# Patient Record
Sex: FEMALE | Race: WHITE | NOT HISPANIC OR LATINO
[De-identification: names, ages, dates, MRNs, and addresses within clinical notes are randomized per-mention and may not be internally consistent; named-entity substitution may affect disease eponyms.]

---

## 2021-05-21 DIAGNOSIS — Z01.818 ENCOUNTER FOR OTHER PREPROCEDURAL EXAMINATION: ICD-10-CM

## 2021-05-22 ENCOUNTER — APPOINTMENT (OUTPATIENT)
Dept: DISASTER EMERGENCY | Facility: CLINIC | Age: 46
End: 2021-05-22

## 2023-01-10 ENCOUNTER — NON-APPOINTMENT (OUTPATIENT)
Age: 48
End: 2023-01-10

## 2023-12-22 ENCOUNTER — NON-APPOINTMENT (OUTPATIENT)
Age: 48
End: 2023-12-22

## 2023-12-26 ENCOUNTER — NON-APPOINTMENT (OUTPATIENT)
Age: 48
End: 2023-12-26

## 2024-02-10 ENCOUNTER — NON-APPOINTMENT (OUTPATIENT)
Age: 49
End: 2024-02-10

## 2024-02-19 ENCOUNTER — NON-APPOINTMENT (OUTPATIENT)
Age: 49
End: 2024-02-19

## 2024-08-01 ENCOUNTER — NON-APPOINTMENT (OUTPATIENT)
Age: 49
End: 2024-08-01

## 2024-10-01 ENCOUNTER — NON-APPOINTMENT (OUTPATIENT)
Age: 49
End: 2024-10-01

## 2024-10-23 ENCOUNTER — NON-APPOINTMENT (OUTPATIENT)
Age: 49
End: 2024-10-23

## 2024-11-26 ENCOUNTER — APPOINTMENT (OUTPATIENT)
Dept: UROLOGY | Facility: CLINIC | Age: 49
End: 2024-11-26

## 2024-12-26 ENCOUNTER — NON-APPOINTMENT (OUTPATIENT)
Age: 49
End: 2024-12-26

## 2025-06-10 ENCOUNTER — INPATIENT (INPATIENT)
Facility: HOSPITAL | Age: 50
LOS: 2 days | Discharge: HOME | DRG: 690 | End: 2025-06-13
Attending: HOSPITALIST | Admitting: HOSPITALIST
Payer: COMMERCIAL

## 2025-06-10 VITALS
HEART RATE: 110 BPM | DIASTOLIC BLOOD PRESSURE: 69 MMHG | SYSTOLIC BLOOD PRESSURE: 110 MMHG | RESPIRATION RATE: 22 BRPM | WEIGHT: 136.91 LBS | OXYGEN SATURATION: 98 % | TEMPERATURE: 99 F

## 2025-06-10 DIAGNOSIS — Z98.890 OTHER SPECIFIED POSTPROCEDURAL STATES: Chronic | ICD-10-CM

## 2025-06-10 DIAGNOSIS — N12 TUBULO-INTERSTITIAL NEPHRITIS, NOT SPECIFIED AS ACUTE OR CHRONIC: ICD-10-CM

## 2025-06-10 LAB
ALBUMIN SERPL ELPH-MCNC: 4.1 G/DL — SIGNIFICANT CHANGE UP (ref 3.3–5.2)
ALP SERPL-CCNC: 82 U/L — SIGNIFICANT CHANGE UP (ref 40–120)
ALT FLD-CCNC: 23 U/L — SIGNIFICANT CHANGE UP
ANION GAP SERPL CALC-SCNC: 13 MMOL/L — SIGNIFICANT CHANGE UP (ref 5–17)
APPEARANCE UR: CLEAR — SIGNIFICANT CHANGE UP
AST SERPL-CCNC: 27 U/L — SIGNIFICANT CHANGE UP
BACTERIA # UR AUTO: NEGATIVE /HPF — SIGNIFICANT CHANGE UP
BASOPHILS # BLD AUTO: 0.02 K/UL — SIGNIFICANT CHANGE UP (ref 0–0.2)
BASOPHILS # BLD MANUAL: 0 K/UL — SIGNIFICANT CHANGE UP (ref 0–0.2)
BASOPHILS NFR BLD AUTO: 0.2 % — SIGNIFICANT CHANGE UP (ref 0–2)
BASOPHILS NFR BLD MANUAL: 0 % — SIGNIFICANT CHANGE UP (ref 0–2)
BILIRUB SERPL-MCNC: 0.8 MG/DL — SIGNIFICANT CHANGE UP (ref 0.4–2)
BILIRUB UR-MCNC: NEGATIVE — SIGNIFICANT CHANGE UP
BUN SERPL-MCNC: 14.9 MG/DL — SIGNIFICANT CHANGE UP (ref 8–20)
CALCIUM SERPL-MCNC: 9.1 MG/DL — SIGNIFICANT CHANGE UP (ref 8.4–10.5)
CAST: 0 /LPF — SIGNIFICANT CHANGE UP (ref 0–4)
CHLORIDE SERPL-SCNC: 99 MMOL/L — SIGNIFICANT CHANGE UP (ref 96–108)
CO2 SERPL-SCNC: 25 MMOL/L — SIGNIFICANT CHANGE UP (ref 22–29)
COLOR SPEC: YELLOW — SIGNIFICANT CHANGE UP
CREAT SERPL-MCNC: 0.56 MG/DL — SIGNIFICANT CHANGE UP (ref 0.5–1.3)
DIFF PNL FLD: ABNORMAL
EGFR: 113 ML/MIN/1.73M2 — SIGNIFICANT CHANGE UP
EGFR: 113 ML/MIN/1.73M2 — SIGNIFICANT CHANGE UP
EOSINOPHIL # BLD AUTO: 0.01 K/UL — SIGNIFICANT CHANGE UP (ref 0–0.5)
EOSINOPHIL # BLD MANUAL: 0 K/UL — SIGNIFICANT CHANGE UP (ref 0–0.5)
EOSINOPHIL NFR BLD AUTO: 0.1 % — SIGNIFICANT CHANGE UP (ref 0–6)
EOSINOPHIL NFR BLD MANUAL: 0 % — SIGNIFICANT CHANGE UP (ref 0–6)
GLUCOSE SERPL-MCNC: 96 MG/DL — SIGNIFICANT CHANGE UP (ref 70–99)
GLUCOSE UR QL: NEGATIVE MG/DL — SIGNIFICANT CHANGE UP
HCG SERPL-ACNC: <4 MIU/ML — SIGNIFICANT CHANGE UP
HCT VFR BLD CALC: 41.3 % — SIGNIFICANT CHANGE UP (ref 34.5–45)
HGB BLD-MCNC: 13.9 G/DL — SIGNIFICANT CHANGE UP (ref 11.5–15.5)
IMM GRANULOCYTES # BLD AUTO: 0.04 K/UL — SIGNIFICANT CHANGE UP (ref 0–0.07)
IMM GRANULOCYTES NFR BLD AUTO: 0.3 % — SIGNIFICANT CHANGE UP (ref 0–0.9)
KETONES UR QL: ABNORMAL MG/DL
LACTATE BLDV-MCNC: 1 MMOL/L — SIGNIFICANT CHANGE UP (ref 0.5–2)
LEUKOCYTE ESTERASE UR-ACNC: ABNORMAL
LIDOCAIN IGE QN: 22 U/L — SIGNIFICANT CHANGE UP (ref 22–51)
LYMPHOCYTES # BLD AUTO: 0.3 K/UL — LOW (ref 1–3.3)
LYMPHOCYTES # BLD MANUAL: 0.61 K/UL — LOW (ref 1–3.3)
LYMPHOCYTES NFR BLD AUTO: 2.4 % — LOW (ref 13–44)
LYMPHOCYTES NFR BLD MANUAL: 5 % — LOW (ref 13–44)
MANUAL NEUTROPHIL BANDS #: 1.24 K/UL — HIGH (ref 0–0.84)
MANUAL SMEAR VERIFICATION: SIGNIFICANT CHANGE UP
MCHC RBC-ENTMCNC: 29.1 PG — SIGNIFICANT CHANGE UP (ref 27–34)
MCHC RBC-ENTMCNC: 33.7 G/DL — SIGNIFICANT CHANGE UP (ref 32–36)
MCV RBC AUTO: 86.4 FL — SIGNIFICANT CHANGE UP (ref 80–100)
MONOCYTES # BLD AUTO: 0.14 K/UL — SIGNIFICANT CHANGE UP (ref 0–0.9)
MONOCYTES # BLD MANUAL: 0.1 K/UL — SIGNIFICANT CHANGE UP (ref 0–0.9)
MONOCYTES NFR BLD AUTO: 1.1 % — LOW (ref 2–14)
MONOCYTES NFR BLD MANUAL: 0.8 % — LOW (ref 2–14)
NEUTROPHILS # BLD AUTO: 11.78 K/UL — HIGH (ref 1.8–7.4)
NEUTROPHILS # BLD MANUAL: 10.34 K/UL — HIGH (ref 1.8–7.4)
NEUTROPHILS NFR BLD AUTO: 95.9 % — HIGH (ref 43–77)
NEUTROPHILS NFR BLD MANUAL: 84.1 % — HIGH (ref 43–77)
NEUTS BAND # BLD: 10.1 % — HIGH (ref 0–8)
NEUTS BAND NFR BLD: 10.1 % — HIGH (ref 0–8)
NITRITE UR-MCNC: NEGATIVE — SIGNIFICANT CHANGE UP
NRBC # BLD AUTO: 0 K/UL — SIGNIFICANT CHANGE UP (ref 0–0)
NRBC # FLD: 0 K/UL — SIGNIFICANT CHANGE UP (ref 0–0)
NRBC BLD AUTO-RTO: 0 /100 WBCS — SIGNIFICANT CHANGE UP (ref 0–0)
PH UR: 6.5 — SIGNIFICANT CHANGE UP (ref 5–8)
PLAT MORPH BLD: NORMAL — SIGNIFICANT CHANGE UP
PLATELET # BLD AUTO: 249 K/UL — SIGNIFICANT CHANGE UP (ref 150–400)
PMV BLD: 11.7 FL — SIGNIFICANT CHANGE UP (ref 7–13)
POTASSIUM SERPL-MCNC: 3.7 MMOL/L — SIGNIFICANT CHANGE UP (ref 3.5–5.3)
POTASSIUM SERPL-SCNC: 3.7 MMOL/L — SIGNIFICANT CHANGE UP (ref 3.5–5.3)
PROT SERPL-MCNC: 7 G/DL — SIGNIFICANT CHANGE UP (ref 6.6–8.7)
PROT UR-MCNC: SIGNIFICANT CHANGE UP MG/DL
RBC # BLD: 4.78 M/UL — SIGNIFICANT CHANGE UP (ref 3.8–5.2)
RBC # FLD: 12.3 % — SIGNIFICANT CHANGE UP (ref 10.3–14.5)
RBC BLD AUTO: NORMAL — SIGNIFICANT CHANGE UP
RBC CASTS # UR COMP ASSIST: 3 /HPF — SIGNIFICANT CHANGE UP (ref 0–4)
SODIUM SERPL-SCNC: 137 MMOL/L — SIGNIFICANT CHANGE UP (ref 135–145)
SP GR SPEC: 1.02 — SIGNIFICANT CHANGE UP (ref 1–1.03)
SQUAMOUS # UR AUTO: 6 /HPF — HIGH (ref 0–5)
UROBILINOGEN FLD QL: 0.2 MG/DL — SIGNIFICANT CHANGE UP (ref 0.2–1)
WBC # BLD: 12.29 K/UL — HIGH (ref 3.8–10.5)
WBC # FLD AUTO: 12.29 K/UL — HIGH (ref 3.8–10.5)
WBC UR QL: 86 /HPF — HIGH (ref 0–5)

## 2025-06-10 PROCEDURE — 99223 1ST HOSP IP/OBS HIGH 75: CPT

## 2025-06-10 PROCEDURE — 93010 ELECTROCARDIOGRAM REPORT: CPT

## 2025-06-10 PROCEDURE — 99285 EMERGENCY DEPT VISIT HI MDM: CPT

## 2025-06-10 RX ORDER — CEFTRIAXONE 500 MG/1
2000 INJECTION, POWDER, FOR SOLUTION INTRAMUSCULAR; INTRAVENOUS EVERY 24 HOURS
Refills: 0 | Status: DISCONTINUED | OUTPATIENT
Start: 2025-06-11 | End: 2025-06-13

## 2025-06-10 RX ORDER — TRAMADOL HYDROCHLORIDE 50 MG/1
25 TABLET, FILM COATED ORAL EVERY 6 HOURS
Refills: 0 | Status: DISCONTINUED | OUTPATIENT
Start: 2025-06-10 | End: 2025-06-13

## 2025-06-10 RX ORDER — KETOROLAC TROMETHAMINE 30 MG/ML
15 INJECTION, SOLUTION INTRAMUSCULAR; INTRAVENOUS ONCE
Refills: 0 | Status: DISCONTINUED | OUTPATIENT
Start: 2025-06-10 | End: 2025-06-10

## 2025-06-10 RX ORDER — CEFTRIAXONE 500 MG/1
1000 INJECTION, POWDER, FOR SOLUTION INTRAMUSCULAR; INTRAVENOUS ONCE
Refills: 0 | Status: DISCONTINUED | OUTPATIENT
Start: 2025-06-10 | End: 2025-06-10

## 2025-06-10 RX ORDER — CEFTRIAXONE 500 MG/1
2000 INJECTION, POWDER, FOR SOLUTION INTRAMUSCULAR; INTRAVENOUS ONCE
Refills: 0 | Status: DISCONTINUED | OUTPATIENT
Start: 2025-06-10 | End: 2025-06-10

## 2025-06-10 RX ORDER — ONDANSETRON HCL/PF 4 MG/2 ML
4 VIAL (ML) INJECTION EVERY 8 HOURS
Refills: 0 | Status: DISCONTINUED | OUTPATIENT
Start: 2025-06-10 | End: 2025-06-13

## 2025-06-10 RX ORDER — MELATONIN 5 MG
3 TABLET ORAL AT BEDTIME
Refills: 0 | Status: DISCONTINUED | OUTPATIENT
Start: 2025-06-10 | End: 2025-06-13

## 2025-06-10 RX ORDER — TRAMADOL HYDROCHLORIDE 50 MG/1
50 TABLET, FILM COATED ORAL EVERY 6 HOURS
Refills: 0 | Status: DISCONTINUED | OUTPATIENT
Start: 2025-06-10 | End: 2025-06-13

## 2025-06-10 RX ORDER — ACETAMINOPHEN 500 MG/5ML
650 LIQUID (ML) ORAL EVERY 6 HOURS
Refills: 0 | Status: DISCONTINUED | OUTPATIENT
Start: 2025-06-10 | End: 2025-06-13

## 2025-06-10 RX ORDER — CEFTRIAXONE 500 MG/1
2000 INJECTION, POWDER, FOR SOLUTION INTRAMUSCULAR; INTRAVENOUS EVERY 24 HOURS
Refills: 0 | Status: DISCONTINUED | OUTPATIENT
Start: 2025-06-10 | End: 2025-06-10

## 2025-06-10 RX ORDER — SODIUM CHLORIDE 9 G/1000ML
2000 INJECTION, SOLUTION INTRAVENOUS ONCE
Refills: 0 | Status: COMPLETED | OUTPATIENT
Start: 2025-06-10 | End: 2025-06-10

## 2025-06-10 RX ORDER — SUCRALFATE 1 G
1 TABLET ORAL
Refills: 0 | Status: DISCONTINUED | OUTPATIENT
Start: 2025-06-10 | End: 2025-06-13

## 2025-06-10 RX ORDER — SUMATRIPTAN 100 MG/1
25 TABLET, FILM COATED ORAL DAILY
Refills: 0 | Status: DISCONTINUED | OUTPATIENT
Start: 2025-06-10 | End: 2025-06-13

## 2025-06-10 RX ORDER — SODIUM CHLORIDE 9 G/1000ML
1000 INJECTION, SOLUTION INTRAVENOUS
Refills: 0 | Status: DISCONTINUED | OUTPATIENT
Start: 2025-06-10 | End: 2025-06-13

## 2025-06-10 RX ORDER — MAGNESIUM, ALUMINUM HYDROXIDE 200-200 MG
30 TABLET,CHEWABLE ORAL EVERY 4 HOURS
Refills: 0 | Status: DISCONTINUED | OUTPATIENT
Start: 2025-06-10 | End: 2025-06-13

## 2025-06-10 RX ORDER — CEFTRIAXONE 500 MG/1
1000 INJECTION, POWDER, FOR SOLUTION INTRAMUSCULAR; INTRAVENOUS ONCE
Refills: 0 | Status: COMPLETED | OUTPATIENT
Start: 2025-06-10 | End: 2025-06-10

## 2025-06-10 RX ORDER — OMEPRAZOLE 20 MG/1
1 CAPSULE, DELAYED RELEASE ORAL
Refills: 0 | DISCHARGE

## 2025-06-10 RX ORDER — PHENAZOPYRIDINE HCL 100 MG
100 TABLET ORAL EVERY 8 HOURS
Refills: 0 | Status: COMPLETED | OUTPATIENT
Start: 2025-06-10 | End: 2025-06-12

## 2025-06-10 RX ORDER — ONDANSETRON HCL/PF 4 MG/2 ML
4 VIAL (ML) INJECTION ONCE
Refills: 0 | Status: COMPLETED | OUTPATIENT
Start: 2025-06-10 | End: 2025-06-10

## 2025-06-10 RX ADMIN — KETOROLAC TROMETHAMINE 15 MILLIGRAM(S): 30 INJECTION, SOLUTION INTRAMUSCULAR; INTRAVENOUS at 08:19

## 2025-06-10 RX ADMIN — TRAMADOL HYDROCHLORIDE 50 MILLIGRAM(S): 50 TABLET, FILM COATED ORAL at 17:44

## 2025-06-10 RX ADMIN — SUMATRIPTAN 25 MILLIGRAM(S): 100 TABLET, FILM COATED ORAL at 15:36

## 2025-06-10 RX ADMIN — Medication 1 GRAM(S): at 17:44

## 2025-06-10 RX ADMIN — Medication 4 MILLIGRAM(S): at 08:19

## 2025-06-10 RX ADMIN — CEFTRIAXONE 1000 MILLIGRAM(S): 500 INJECTION, POWDER, FOR SOLUTION INTRAMUSCULAR; INTRAVENOUS at 08:19

## 2025-06-10 RX ADMIN — SODIUM CHLORIDE 2000 MILLILITER(S): 9 INJECTION, SOLUTION INTRAVENOUS at 08:19

## 2025-06-10 RX ADMIN — SUMATRIPTAN 25 MILLIGRAM(S): 100 TABLET, FILM COATED ORAL at 16:30

## 2025-06-10 RX ADMIN — Medication 100 MILLIGRAM(S): at 21:38

## 2025-06-10 RX ADMIN — Medication 100 MILLIGRAM(S): at 13:22

## 2025-06-10 RX ADMIN — SODIUM CHLORIDE 100 MILLILITER(S): 9 INJECTION, SOLUTION INTRAVENOUS at 21:39

## 2025-06-10 RX ADMIN — TRAMADOL HYDROCHLORIDE 50 MILLIGRAM(S): 50 TABLET, FILM COATED ORAL at 18:58

## 2025-06-10 RX ADMIN — CEFTRIAXONE 1000 MILLIGRAM(S): 500 INJECTION, POWDER, FOR SOLUTION INTRAMUSCULAR; INTRAVENOUS at 11:45

## 2025-06-10 NOTE — ED ADULT NURSE NOTE - OBJECTIVE STATEMENT
Assumed care of pt at 0714 in . Pt A&Ox4 c/o abdominal pain. pt states she has frequent kidney infections and feels like one is coming on.

## 2025-06-10 NOTE — H&P ADULT - ASSESSMENT
49y/oF PMH recurrent UTIs with hx sepsis/bacteremia (starting in 8/2024), Asherman syndrome, GERD presenting to ER c/o subjective fever, chills, low back pain L>R, dysuria. Reports similar symptoms with UTI/pyelonephritis in the past, admitted with pyelonephritis     Pyelonephritis   Leukocytosis w/bandemia   -s/p 1g ceftriaxone, 2L LR in ER  49y/oF PMH recurrent UTIs with hx sepsis/bacteremia (starting in 8/2024), Asherman syndrome, GERD presenting to ER c/o subjective fever, chills, low back pain L>R, dysuria. Reports similar symptoms with UTI/pyelonephritis in the past, admitted with pyelonephritis     Pyelonephritis   Leukocytosis w/bandemia   -s/p 1g ceftriaxone, 2L LR in ER   -add additional 1g ceftriaxone today for pyelo   -cont 2g ceftriaxone starting tomorrow   -f/u blood and urine cultures   -pain control     GERD   -cont ppi  -add sucralfate     vte ppx: scds

## 2025-06-10 NOTE — ED ADULT TRIAGE NOTE - CHIEF COMPLAINT QUOTE
patient reports dysuria with bilateral flank pain since yesterday.  Palpitations, anxious affect, stating she feels she is septic,

## 2025-06-10 NOTE — H&P ADULT - NSHPLABSRESULTS_GEN_ALL_CORE
13.9   12.29 )-----------( 249      ( 10 Geovani 2025 08:29 )             41.3     06-10    137  |  99  |  14.9  ----------------------------<  96  3.7   |  25.0  |  0.56    Ca    9.1      10 Geovani 2025 08:29    TPro  7.0  /  Alb  4.1  /  TBili  0.8  /  DBili  x   /  AST  27  /  ALT  23  /  AlkPhos  82  06-10

## 2025-06-10 NOTE — ED PROVIDER NOTE - NSICDXPASTSURGICALHX_GEN_ALL_CORE_FT
Cpt Code (06628, 26249 Or 71643): 69451
Professional Component, Technical Component Or Both?: professional and technical component
Rendering Text In Billing: The biopsy specimen was grossed and processed into a slide.
Rendering Text In Billing: The slide was read, and reported in an attached document.
Number Of Multiplex Special Stains Used (76363): None
Detail Level: Detailed
Rendering Text In Billing: The slides were read, and reported in an attached document.
Number Of Specimens Per Diagnosis: 1
Immunohistochemistry ([id number] and 15766) billing is not performed here. Please use the Immunohistochemistry Stain Billing plan to accomplish this.
Rendering Text In Billing: The biopsy specimens were grossed and processed into slides.
PAST SURGICAL HISTORY:  No significant past surgical history

## 2025-06-10 NOTE — H&P ADULT - NSHPPHYSICALEXAM_GEN_ALL_CORE
CONSTITUTIONAL: NAD  CARDIAC: Regular rate, regular rhythm.  normal +S1, S2  RESPIRATORY: Clear to auscultation bilaterally  GASTROINTESTINAL: Abdomen soft, non-tender, no guarding or rebound   : +CVA tenderness L>R  NEUROLOGICAL: Alert and oriented, grossly non-focal   SKIN: warm, dry

## 2025-06-10 NOTE — ED PROVIDER NOTE - GASTROINTESTINAL, MLM
POST-OP DIAGNOSIS:  Fibroadenoma, left 10-May-2021 08:39:05  Niyah Matthews  
Abdomen soft, non-tender, no guarding. BL CVAT.

## 2025-06-10 NOTE — ED PROVIDER NOTE - OBJECTIVE STATEMENT
PT with SPMHX of  multiple complicated UTI resulting in bacteriemia and sepsis in outside facility  presents to the ED with complaint of  urinary symptoms back pain subjective fever, chills and uncontrollable shivering. Pt states that she had a gradual onset of symptoms since yesterday that have been constant since onset, she describes as burning with urination, PT with SPMHX of  multiple complicated UTI resulting in bacteriemia and sepsis in outside facility  presents to the ED with complaint of  urinary symptoms back pain subjective fever, chills and uncontrollable shivering. Pt states that she had a gradual onset of symptoms since yesterday that have been constant since onset, she describes as burning with urination, PT dines cough, SOB, diff breathing, HA, dizziness, rash, weakness.

## 2025-06-10 NOTE — ED PROVIDER NOTE - CLINICAL SUMMARY MEDICAL DECISION MAKING FREE TEXT BOX
Emperatriz ATTG   49F hx of pyelo w/ sepsis coming in for similar events as previous, pt found to have increased bands,  w/ her symptoms being the same as before in which she developed sepsis, plan for admission

## 2025-06-10 NOTE — H&P ADULT - HISTORY OF PRESENT ILLNESS
49y/oF PMH recurrent UTIs with hx sepsis/bacteremia (starting in 8/2024), Asherman  49y/oF PMH recurrent UTIs with hx sepsis/bacteremia (starting in 8/2024), Asherman syndrome, GERD presenting to ER c/o subjective fever, chills, low back pain L>R, dysuria. Reports similar symptoms with UTI/pyelonephritis in the past.     Reports seeing urology, gynecology, nephrology as outpt for further w/u given recurrent infections without significant findings thus far.

## 2025-06-10 NOTE — ED PROVIDER NOTE - ATTENDING APP SHARED VISIT CONTRIBUTION OF CARE
Emperatriz ATTG See MDM I performed a history and physical exam of the patient and discussed their management with the Physician assistant reviewed the PAs note and agree with the documented findings and plan of care. My medical decision making and observations are found above.

## 2025-06-11 ENCOUNTER — TRANSCRIPTION ENCOUNTER (OUTPATIENT)
Age: 50
End: 2025-06-11

## 2025-06-11 LAB
ALBUMIN SERPL ELPH-MCNC: 3.3 G/DL — SIGNIFICANT CHANGE UP (ref 3.3–5.2)
ALP SERPL-CCNC: 53 U/L — SIGNIFICANT CHANGE UP (ref 40–120)
ALT FLD-CCNC: 17 U/L — SIGNIFICANT CHANGE UP
ANION GAP SERPL CALC-SCNC: 11 MMOL/L — SIGNIFICANT CHANGE UP (ref 5–17)
AST SERPL-CCNC: 19 U/L — SIGNIFICANT CHANGE UP
BASOPHILS # BLD AUTO: 0.01 K/UL — SIGNIFICANT CHANGE UP (ref 0–0.2)
BASOPHILS NFR BLD AUTO: 0.1 % — SIGNIFICANT CHANGE UP (ref 0–2)
BILIRUB DIRECT SERPL-MCNC: 0.1 MG/DL — SIGNIFICANT CHANGE UP (ref 0–0.3)
BILIRUB INDIRECT FLD-MCNC: 0.2 MG/DL — SIGNIFICANT CHANGE UP (ref 0.2–1)
BILIRUB SERPL-MCNC: 0.3 MG/DL — LOW (ref 0.4–2)
BUN SERPL-MCNC: 6.4 MG/DL — LOW (ref 8–20)
CALCIUM SERPL-MCNC: 8.7 MG/DL — SIGNIFICANT CHANGE UP (ref 8.4–10.5)
CHLORIDE SERPL-SCNC: 105 MMOL/L — SIGNIFICANT CHANGE UP (ref 96–108)
CO2 SERPL-SCNC: 26 MMOL/L — SIGNIFICANT CHANGE UP (ref 22–29)
CREAT SERPL-MCNC: 0.49 MG/DL — LOW (ref 0.5–1.3)
CULTURE RESULTS: SIGNIFICANT CHANGE UP
EGFR: 116 ML/MIN/1.73M2 — SIGNIFICANT CHANGE UP
EGFR: 116 ML/MIN/1.73M2 — SIGNIFICANT CHANGE UP
EOSINOPHIL # BLD AUTO: 0.24 K/UL — SIGNIFICANT CHANGE UP (ref 0–0.5)
EOSINOPHIL NFR BLD AUTO: 2.5 % — SIGNIFICANT CHANGE UP (ref 0–6)
GLUCOSE SERPL-MCNC: 102 MG/DL — HIGH (ref 70–99)
HCT VFR BLD CALC: 32.9 % — LOW (ref 34.5–45)
HGB BLD-MCNC: 11 G/DL — LOW (ref 11.5–15.5)
IMM GRANULOCYTES # BLD AUTO: 0.02 K/UL — SIGNIFICANT CHANGE UP (ref 0–0.07)
IMM GRANULOCYTES NFR BLD AUTO: 0.2 % — SIGNIFICANT CHANGE UP (ref 0–0.9)
LYMPHOCYTES # BLD AUTO: 1.23 K/UL — SIGNIFICANT CHANGE UP (ref 1–3.3)
LYMPHOCYTES NFR BLD AUTO: 13.1 % — SIGNIFICANT CHANGE UP (ref 13–44)
MAGNESIUM SERPL-MCNC: 1.8 MG/DL — SIGNIFICANT CHANGE UP (ref 1.6–2.6)
MCHC RBC-ENTMCNC: 29.3 PG — SIGNIFICANT CHANGE UP (ref 27–34)
MCHC RBC-ENTMCNC: 33.4 G/DL — SIGNIFICANT CHANGE UP (ref 32–36)
MCV RBC AUTO: 87.7 FL — SIGNIFICANT CHANGE UP (ref 80–100)
MONOCYTES # BLD AUTO: 0.31 K/UL — SIGNIFICANT CHANGE UP (ref 0–0.9)
MONOCYTES NFR BLD AUTO: 3.3 % — SIGNIFICANT CHANGE UP (ref 2–14)
NEUTROPHILS # BLD AUTO: 7.61 K/UL — HIGH (ref 1.8–7.4)
NEUTROPHILS NFR BLD AUTO: 80.8 % — HIGH (ref 43–77)
NRBC # BLD AUTO: 0 K/UL — SIGNIFICANT CHANGE UP (ref 0–0)
NRBC # FLD: 0 K/UL — SIGNIFICANT CHANGE UP (ref 0–0)
NRBC BLD AUTO-RTO: 0 /100 WBCS — SIGNIFICANT CHANGE UP (ref 0–0)
PHOSPHATE SERPL-MCNC: 3.5 MG/DL — SIGNIFICANT CHANGE UP (ref 2.4–4.7)
PLATELET # BLD AUTO: 187 K/UL — SIGNIFICANT CHANGE UP (ref 150–400)
PMV BLD: 12.1 FL — SIGNIFICANT CHANGE UP (ref 7–13)
POTASSIUM SERPL-MCNC: 3.9 MMOL/L — SIGNIFICANT CHANGE UP (ref 3.5–5.3)
POTASSIUM SERPL-SCNC: 3.9 MMOL/L — SIGNIFICANT CHANGE UP (ref 3.5–5.3)
PROT SERPL-MCNC: 5.7 G/DL — LOW (ref 6.6–8.7)
RBC # BLD: 3.75 M/UL — LOW (ref 3.8–5.2)
RBC # FLD: 12.6 % — SIGNIFICANT CHANGE UP (ref 10.3–14.5)
SODIUM SERPL-SCNC: 141 MMOL/L — SIGNIFICANT CHANGE UP (ref 135–145)
SPECIMEN SOURCE: SIGNIFICANT CHANGE UP
WBC # BLD: 9.42 K/UL — SIGNIFICANT CHANGE UP (ref 3.8–10.5)
WBC # FLD AUTO: 9.42 K/UL — SIGNIFICANT CHANGE UP (ref 3.8–10.5)

## 2025-06-11 PROCEDURE — 74177 CT ABD & PELVIS W/CONTRAST: CPT | Mod: 26

## 2025-06-11 PROCEDURE — 99233 SBSQ HOSP IP/OBS HIGH 50: CPT

## 2025-06-11 PROCEDURE — 99223 1ST HOSP IP/OBS HIGH 75: CPT

## 2025-06-11 RX ADMIN — Medication 100 MILLIGRAM(S): at 06:00

## 2025-06-11 RX ADMIN — Medication 1 GRAM(S): at 17:50

## 2025-06-11 RX ADMIN — CEFTRIAXONE 2000 MILLIGRAM(S): 500 INJECTION, POWDER, FOR SOLUTION INTRAMUSCULAR; INTRAVENOUS at 12:25

## 2025-06-11 RX ADMIN — SODIUM CHLORIDE 100 MILLILITER(S): 9 INJECTION, SOLUTION INTRAVENOUS at 23:35

## 2025-06-11 RX ADMIN — Medication 100 MILLIGRAM(S): at 13:55

## 2025-06-11 RX ADMIN — TRAMADOL HYDROCHLORIDE 50 MILLIGRAM(S): 50 TABLET, FILM COATED ORAL at 08:30

## 2025-06-11 RX ADMIN — SODIUM CHLORIDE 100 MILLILITER(S): 9 INJECTION, SOLUTION INTRAVENOUS at 09:08

## 2025-06-11 RX ADMIN — TRAMADOL HYDROCHLORIDE 50 MILLIGRAM(S): 50 TABLET, FILM COATED ORAL at 07:54

## 2025-06-11 RX ADMIN — SODIUM CHLORIDE 100 MILLILITER(S): 9 INJECTION, SOLUTION INTRAVENOUS at 00:07

## 2025-06-11 RX ADMIN — Medication 40 MILLIGRAM(S): at 07:46

## 2025-06-11 RX ADMIN — Medication 650 MILLIGRAM(S): at 06:31

## 2025-06-11 RX ADMIN — Medication 1 GRAM(S): at 12:24

## 2025-06-11 RX ADMIN — Medication 100 MILLIGRAM(S): at 21:57

## 2025-06-11 RX ADMIN — TRAMADOL HYDROCHLORIDE 50 MILLIGRAM(S): 50 TABLET, FILM COATED ORAL at 19:35

## 2025-06-11 RX ADMIN — Medication 1 GRAM(S): at 23:33

## 2025-06-11 RX ADMIN — Medication 1 GRAM(S): at 06:00

## 2025-06-11 RX ADMIN — Medication 650 MILLIGRAM(S): at 06:00

## 2025-06-11 RX ADMIN — TRAMADOL HYDROCHLORIDE 50 MILLIGRAM(S): 50 TABLET, FILM COATED ORAL at 20:00

## 2025-06-11 RX ADMIN — Medication 4 MILLIGRAM(S): at 19:39

## 2025-06-11 RX ADMIN — Medication 4 MILLIGRAM(S): at 09:06

## 2025-06-11 RX ADMIN — Medication 1 GRAM(S): at 00:06

## 2025-06-11 NOTE — CONSULT NOTE ADULT - ASSESSMENT
49y/oF PMH recurrent UTIs with hx sepsis/bacteremia (starting in 8/2024), Asherman syndrome, GERD presenting to ER c/o subjective fever, chills, low back pain L>R, dysuria. Reports similar symptoms with UTI/pyelonephritis in the past.   Reports seeing urology, gynecology, nephrology as outpt for further w/u given recurrent infections without significant findings thus far.    Patient admitted for pyelonephritis, reports this is her 5th infection in one year  ID called for pyelonephritis and recurrent UTI    - pt reports feeling better since yesterday, will c/w ceftriaxone  - UA + pyuria  - f/u bcx  - f/u ucx  - check CT a/p  - Continue ceftriaxone  - will try to review prior cultures from [facility]  - Trend Fever  - Trend Leukocytosis    d/w Dr Polk, RN  Will Follow

## 2025-06-11 NOTE — CONSULT NOTE ADULT - TIME BILLING
Chart/notes review, interpretation of laboratory and imaging results,  patient assessment, documentation, case discussion with interdisciplinary team

## 2025-06-11 NOTE — CONSULT NOTE ADULT - SUBJECTIVE AND OBJECTIVE BOX
[facility]                                                INFECTIOUS DISEASES  =======================================================                     Remi Trivedi#   Kip Brown MD#   Malina Medley MD*                           Lora Barger MD*   Briseida Fitch MD*  Leida Rooney*            Diplomates American Board of Internal Medicine & Infectious Diseases                  # [place] Office - Appt - Tel  [phone] Fax [phone]                * [place] Office - Appt - Tel [phone] Fax [phone]                                  Hospital Consult line:  [phone]  =======================================================      [id number]  BRUNA BLOUNT   HPI:  49y/oF PMH recurrent UTIs with hx sepsis/bacteremia (starting in 8/2024), Asherman syndrome, GERD presenting to ER c/o subjective fever, chills, low back pain L>R, dysuria. Reports similar symptoms with UTI/pyelonephritis in the past.     Reports seeing urology, gynecology, nephrology as outpt for further w/u given recurrent infections without significant findings thus far.  (10 Geovani 2025 10:40)    ID note-pt reports 4x uti in the past year, never had recurrent UTI in the past  x2 episodes at [facility]  x1 in [facility]  x1 january 2025 at [facility]  prior to these episodes had h/o bacterial vaginosis which eventually resolved  was found to have mild hydronephrosis ? cause. saw urology and underwent cystoscopy which was reportedly normal  prior + Cx group B strep-? source  has h/o asherman syndrome  has not been oral suppression      I have personally reviewed the labs and data; pertinent labs and data are listed in this note; please see below.   =======================================================  Past Medical & Surgical Hx:  =====================  PAST MEDICAL & SURGICAL HISTORY:  Asherman syndrome      GERD (gastroesophageal reflux disease)      H/O gynecological procedure        Problem List:  ==========  HEALTH ISSUES - PROBLEM Dx:        Social Hx:  =======  no toxic habits currently    FAMILY HISTORY:  no significant family history of immunosuppressive disorders in mother or father   =======================================================    REVIEW OF SYSTEMS:  CONSTITUTIONAL:  No Fever or chills  HEENT:  No diplopia or blurred vision.  No earache, sore throat or runny nose.  CARDIOVASCULAR:  No pressure, squeezing, strangling, tightness, heaviness or aching about the chest, neck, axilla or epigastrium.  RESPIRATORY:  No cough, shortness of breath  GASTROINTESTINAL:  No nausea, vomiting or diarrhea.  GENITOURINARY:  + dysuria, no frequency or urgency. No Blood in urine +flank pain  MUSCULOSKELETAL:  no joint aches, no muscle pain  SKIN:  No change in skin, hair or nails.  NEUROLOGIC:  + Headaches, no seizures or weakness.  PSYCHIATRIC:  No disorder of thought or mood.  ENDOCRINE:  No heat or cold intolerance  HEMATOLOGICAL:  No easy bruising or bleeding.    =======================================================  Allergies    No Known Allergies    Intolerances    Antibiotics:  cefTRIAXone Injectable. 2000 milliGRAM(s) IV Push every 24 hours    Other medications:  lactated ringers. 1000 milliLiter(s) IV Continuous <Continuous>  pantoprazole    Tablet 40 milliGRAM(s) Oral before breakfast  phenazopyridine 100 milliGRAM(s) Oral every 8 hours  sucralfate suspension 1 Gram(s) Oral four times a day     cefTRIAXone Injectable.   1000 milliGRAM(s) IV Push (06-10-25 @ 11:45)    cefTRIAXone Injectable.   2000 milliGRAM(s) IV Push (06-11-25 @ 12:25)    cefTRIAXone Injectable.   1000 milliGRAM(s) IV Push (06-10-25 @ 08:19)      ======================================================  Physical Exam:  ============  T(F): 98.5 (11 Jun 2025 08:04), Max: 98.6 (11 Jun 2025 04:00)  HR: 82 (11 Jun 2025 08:04)  BP: 107/65 (11 Jun 2025 08:04)  RR: 16 (11 Jun 2025 08:04)  SpO2: 96% (11 Jun 2025 08:04) (94% - 96%)  temp max in last 48H T(F): , Max: 98.7 (06-10-25 @ 06:00)    General:  No acute distress.  Eye:  Normal conjunctiva.  Respiratory: Lungs are clear to auscultation, Respirations are non-labored.  Cardiovascular: Normal rate, Regular rhythm, s1 + s2  Gastrointestinal: Soft, Non-tender, Non-distended, Normal bowel sounds.  Genitourinary: left costovertebral angle tenderness >right  Integumentary: No rash.  Neurologic: Alert, Oriented, No focal deficits  Psychiatric: Appropriate mood & affect.    =======================================================  Labs:                        11.0   9.42  )-----------( 187      ( 11 Jun 2025 04:44 )             32.9     06-11    141  |  105  |  6.4[L]  ----------------------------<  102[H]  3.9   |  26.0  |  0.49[L]    Ca    8.7      11 Jun 2025 04:44  Phos  3.5     06-11  Mg     1.8     06-11    TPro  5.7[L]  /  Alb  3.3  /  TBili  0.3[L]  /  DBili  0.1  /  AST  19  /  ALT  17  /  AlkPhos  53  06-11

## 2025-06-12 LAB
ALBUMIN SERPL ELPH-MCNC: 3.4 G/DL — SIGNIFICANT CHANGE UP (ref 3.3–5.2)
ALP SERPL-CCNC: 59 U/L — SIGNIFICANT CHANGE UP (ref 40–120)
ALT FLD-CCNC: 20 U/L — SIGNIFICANT CHANGE UP
ANION GAP SERPL CALC-SCNC: 14 MMOL/L — SIGNIFICANT CHANGE UP (ref 5–17)
AST SERPL-CCNC: 21 U/L — SIGNIFICANT CHANGE UP
BASOPHILS # BLD AUTO: 0.02 K/UL — SIGNIFICANT CHANGE UP (ref 0–0.2)
BASOPHILS NFR BLD AUTO: 0.3 % — SIGNIFICANT CHANGE UP (ref 0–2)
BILIRUB SERPL-MCNC: <0.2 MG/DL — LOW (ref 0.4–2)
BUN SERPL-MCNC: 6.1 MG/DL — LOW (ref 8–20)
CALCIUM SERPL-MCNC: 8.5 MG/DL — SIGNIFICANT CHANGE UP (ref 8.4–10.5)
CHLORIDE SERPL-SCNC: 101 MMOL/L — SIGNIFICANT CHANGE UP (ref 96–108)
CO2 SERPL-SCNC: 26 MMOL/L — SIGNIFICANT CHANGE UP (ref 22–29)
CREAT SERPL-MCNC: 0.57 MG/DL — SIGNIFICANT CHANGE UP (ref 0.5–1.3)
EGFR: 112 ML/MIN/1.73M2 — SIGNIFICANT CHANGE UP
EGFR: 112 ML/MIN/1.73M2 — SIGNIFICANT CHANGE UP
EOSINOPHIL # BLD AUTO: 0.18 K/UL — SIGNIFICANT CHANGE UP (ref 0–0.5)
EOSINOPHIL NFR BLD AUTO: 2.9 % — SIGNIFICANT CHANGE UP (ref 0–6)
FERRITIN SERPL-MCNC: 120 NG/ML — SIGNIFICANT CHANGE UP (ref 15–150)
GLUCOSE SERPL-MCNC: 98 MG/DL — SIGNIFICANT CHANGE UP (ref 70–99)
HCT VFR BLD CALC: 33.6 % — LOW (ref 34.5–45)
HGB BLD-MCNC: 11.2 G/DL — LOW (ref 11.5–15.5)
IMM GRANULOCYTES # BLD AUTO: 0.07 K/UL — SIGNIFICANT CHANGE UP (ref 0–0.07)
IMM GRANULOCYTES NFR BLD AUTO: 1.1 % — HIGH (ref 0–0.9)
IMMATURE RETICULOCYTE FRACTION %: 3.1 % — SIGNIFICANT CHANGE UP
IRON SATN MFR SERPL: 22 UG/DL — LOW (ref 37–145)
IRON SATN MFR SERPL: 8 % — LOW (ref 14–50)
LYMPHOCYTES # BLD AUTO: 1.2 K/UL — SIGNIFICANT CHANGE UP (ref 1–3.3)
LYMPHOCYTES NFR BLD AUTO: 19 % — SIGNIFICANT CHANGE UP (ref 13–44)
MAGNESIUM SERPL-MCNC: 1.9 MG/DL — SIGNIFICANT CHANGE UP (ref 1.6–2.6)
MCHC RBC-ENTMCNC: 29.3 PG — SIGNIFICANT CHANGE UP (ref 27–34)
MCHC RBC-ENTMCNC: 33.3 G/DL — SIGNIFICANT CHANGE UP (ref 32–36)
MCV RBC AUTO: 88 FL — SIGNIFICANT CHANGE UP (ref 80–100)
MONOCYTES # BLD AUTO: 0.48 K/UL — SIGNIFICANT CHANGE UP (ref 0–0.9)
MONOCYTES NFR BLD AUTO: 7.6 % — SIGNIFICANT CHANGE UP (ref 2–14)
NEUTROPHILS # BLD AUTO: 4.36 K/UL — SIGNIFICANT CHANGE UP (ref 1.8–7.4)
NEUTROPHILS NFR BLD AUTO: 69.1 % — SIGNIFICANT CHANGE UP (ref 43–77)
NRBC # BLD AUTO: 0 K/UL — SIGNIFICANT CHANGE UP (ref 0–0)
NRBC # FLD: 0 K/UL — SIGNIFICANT CHANGE UP (ref 0–0)
NRBC BLD AUTO-RTO: 0 /100 WBCS — SIGNIFICANT CHANGE UP (ref 0–0)
PLATELET # BLD AUTO: 194 K/UL — SIGNIFICANT CHANGE UP (ref 150–400)
PMV BLD: 11.7 FL — SIGNIFICANT CHANGE UP (ref 7–13)
POTASSIUM SERPL-MCNC: 3.7 MMOL/L — SIGNIFICANT CHANGE UP (ref 3.5–5.3)
POTASSIUM SERPL-SCNC: 3.7 MMOL/L — SIGNIFICANT CHANGE UP (ref 3.5–5.3)
PROT SERPL-MCNC: 5.9 G/DL — LOW (ref 6.6–8.7)
RBC # BLD: 3.82 M/UL — SIGNIFICANT CHANGE UP (ref 3.8–5.2)
RBC # BLD: 3.82 M/UL — SIGNIFICANT CHANGE UP (ref 3.8–5.2)
RBC # FLD: 12.6 % — SIGNIFICANT CHANGE UP (ref 10.3–14.5)
RETICS #: 49.3 K/UL — SIGNIFICANT CHANGE UP (ref 25–125)
RETICS/RBC NFR: 1.3 % — SIGNIFICANT CHANGE UP (ref 0.5–2.5)
RETICULOCYTE HEMOGLOBIN EQUIVALENT: 33.8 PG — SIGNIFICANT CHANGE UP (ref 30.6–40.7)
SODIUM SERPL-SCNC: 140 MMOL/L — SIGNIFICANT CHANGE UP (ref 135–145)
TIBC SERPL-MCNC: 265 UG/DL — SIGNIFICANT CHANGE UP (ref 220–430)
TRANSFERRIN SERPL-MCNC: 185 MG/DL — LOW (ref 192–382)
WBC # BLD: 6.31 K/UL — SIGNIFICANT CHANGE UP (ref 3.8–10.5)
WBC # FLD AUTO: 6.31 K/UL — SIGNIFICANT CHANGE UP (ref 3.8–10.5)

## 2025-06-12 PROCEDURE — 99232 SBSQ HOSP IP/OBS MODERATE 35: CPT

## 2025-06-12 RX ORDER — IRON SUCROSE 20 MG/ML
200 INJECTION, SOLUTION INTRAVENOUS EVERY 24 HOURS
Refills: 0 | Status: DISCONTINUED | OUTPATIENT
Start: 2025-06-12 | End: 2025-06-13

## 2025-06-12 RX ORDER — LACTULOSE 10 G/15ML
10 SOLUTION ORAL EVERY 8 HOURS
Refills: 0 | Status: DISCONTINUED | OUTPATIENT
Start: 2025-06-12 | End: 2025-06-13

## 2025-06-12 RX ORDER — BUTALBITAL, ACETAMINOPHEN AND CAFFEINE 50; 325; 40 MG/1; MG/1; MG/1
1 TABLET ORAL EVERY 6 HOURS
Refills: 0 | Status: DISCONTINUED | OUTPATIENT
Start: 2025-06-12 | End: 2025-06-13

## 2025-06-12 RX ORDER — POLYETHYLENE GLYCOL 3350 17 G/17G
17 POWDER, FOR SOLUTION ORAL DAILY
Refills: 0 | Status: DISCONTINUED | OUTPATIENT
Start: 2025-06-12 | End: 2025-06-13

## 2025-06-12 RX ADMIN — Medication 1 GRAM(S): at 18:18

## 2025-06-12 RX ADMIN — TRAMADOL HYDROCHLORIDE 50 MILLIGRAM(S): 50 TABLET, FILM COATED ORAL at 22:16

## 2025-06-12 RX ADMIN — Medication 40 MILLIGRAM(S): at 08:24

## 2025-06-12 RX ADMIN — TRAMADOL HYDROCHLORIDE 50 MILLIGRAM(S): 50 TABLET, FILM COATED ORAL at 13:45

## 2025-06-12 RX ADMIN — Medication 1 GRAM(S): at 12:35

## 2025-06-12 RX ADMIN — Medication 1 GRAM(S): at 22:18

## 2025-06-12 RX ADMIN — Medication 1 GRAM(S): at 05:32

## 2025-06-12 RX ADMIN — Medication 100 MILLIGRAM(S): at 05:32

## 2025-06-12 RX ADMIN — TRAMADOL HYDROCHLORIDE 50 MILLIGRAM(S): 50 TABLET, FILM COATED ORAL at 06:26

## 2025-06-12 RX ADMIN — SODIUM CHLORIDE 100 MILLILITER(S): 9 INJECTION, SOLUTION INTRAVENOUS at 09:18

## 2025-06-12 RX ADMIN — TRAMADOL HYDROCHLORIDE 50 MILLIGRAM(S): 50 TABLET, FILM COATED ORAL at 23:10

## 2025-06-12 RX ADMIN — SUMATRIPTAN 25 MILLIGRAM(S): 100 TABLET, FILM COATED ORAL at 09:32

## 2025-06-12 RX ADMIN — SUMATRIPTAN 25 MILLIGRAM(S): 100 TABLET, FILM COATED ORAL at 08:32

## 2025-06-12 RX ADMIN — POLYETHYLENE GLYCOL 3350 17 GRAM(S): 17 POWDER, FOR SOLUTION ORAL at 10:37

## 2025-06-12 RX ADMIN — Medication 4 MILLIGRAM(S): at 05:32

## 2025-06-12 RX ADMIN — IRON SUCROSE 110 MILLIGRAM(S): 20 INJECTION, SOLUTION INTRAVENOUS at 10:38

## 2025-06-12 RX ADMIN — TRAMADOL HYDROCHLORIDE 50 MILLIGRAM(S): 50 TABLET, FILM COATED ORAL at 14:45

## 2025-06-12 RX ADMIN — TRAMADOL HYDROCHLORIDE 50 MILLIGRAM(S): 50 TABLET, FILM COATED ORAL at 05:32

## 2025-06-12 RX ADMIN — CEFTRIAXONE 2000 MILLIGRAM(S): 500 INJECTION, POWDER, FOR SOLUTION INTRAMUSCULAR; INTRAVENOUS at 12:34

## 2025-06-12 RX ADMIN — Medication 4 MILLIGRAM(S): at 22:16

## 2025-06-13 ENCOUNTER — TRANSCRIPTION ENCOUNTER (OUTPATIENT)
Age: 50
End: 2025-06-13

## 2025-06-13 VITALS
DIASTOLIC BLOOD PRESSURE: 68 MMHG | OXYGEN SATURATION: 96 % | HEART RATE: 77 BPM | RESPIRATION RATE: 16 BRPM | SYSTOLIC BLOOD PRESSURE: 107 MMHG | TEMPERATURE: 98 F

## 2025-06-13 LAB
ALBUMIN SERPL ELPH-MCNC: 3.3 G/DL — SIGNIFICANT CHANGE UP (ref 3.3–5.2)
ALP SERPL-CCNC: 55 U/L — SIGNIFICANT CHANGE UP (ref 40–120)
ALT FLD-CCNC: 17 U/L — SIGNIFICANT CHANGE UP
ANION GAP SERPL CALC-SCNC: 10 MMOL/L — SIGNIFICANT CHANGE UP (ref 5–17)
AST SERPL-CCNC: 16 U/L — SIGNIFICANT CHANGE UP
BASOPHILS # BLD AUTO: 0.01 K/UL — SIGNIFICANT CHANGE UP (ref 0–0.2)
BASOPHILS NFR BLD AUTO: 0.2 % — SIGNIFICANT CHANGE UP (ref 0–2)
BILIRUB SERPL-MCNC: <0.2 MG/DL — LOW (ref 0.4–2)
BUN SERPL-MCNC: 9.7 MG/DL — SIGNIFICANT CHANGE UP (ref 8–20)
CALCIUM SERPL-MCNC: 8.4 MG/DL — SIGNIFICANT CHANGE UP (ref 8.4–10.5)
CHLORIDE SERPL-SCNC: 103 MMOL/L — SIGNIFICANT CHANGE UP (ref 96–108)
CO2 SERPL-SCNC: 28 MMOL/L — SIGNIFICANT CHANGE UP (ref 22–29)
CREAT SERPL-MCNC: 0.55 MG/DL — SIGNIFICANT CHANGE UP (ref 0.5–1.3)
EGFR: 113 ML/MIN/1.73M2 — SIGNIFICANT CHANGE UP
EGFR: 113 ML/MIN/1.73M2 — SIGNIFICANT CHANGE UP
EOSINOPHIL # BLD AUTO: 0.18 K/UL — SIGNIFICANT CHANGE UP (ref 0–0.5)
EOSINOPHIL NFR BLD AUTO: 4 % — SIGNIFICANT CHANGE UP (ref 0–6)
GLUCOSE SERPL-MCNC: 86 MG/DL — SIGNIFICANT CHANGE UP (ref 70–99)
HCT VFR BLD CALC: 34.7 % — SIGNIFICANT CHANGE UP (ref 34.5–45)
HGB BLD-MCNC: 11.5 G/DL — SIGNIFICANT CHANGE UP (ref 11.5–15.5)
IMM GRANULOCYTES # BLD AUTO: 0.02 K/UL — SIGNIFICANT CHANGE UP (ref 0–0.07)
IMM GRANULOCYTES NFR BLD AUTO: 0.4 % — SIGNIFICANT CHANGE UP (ref 0–0.9)
LYMPHOCYTES # BLD AUTO: 1.75 K/UL — SIGNIFICANT CHANGE UP (ref 1–3.3)
LYMPHOCYTES NFR BLD AUTO: 38.8 % — SIGNIFICANT CHANGE UP (ref 13–44)
MAGNESIUM SERPL-MCNC: 2.2 MG/DL — SIGNIFICANT CHANGE UP (ref 1.6–2.6)
MCHC RBC-ENTMCNC: 29 PG — SIGNIFICANT CHANGE UP (ref 27–34)
MCHC RBC-ENTMCNC: 33.1 G/DL — SIGNIFICANT CHANGE UP (ref 32–36)
MCV RBC AUTO: 87.6 FL — SIGNIFICANT CHANGE UP (ref 80–100)
MONOCYTES # BLD AUTO: 0.43 K/UL — SIGNIFICANT CHANGE UP (ref 0–0.9)
MONOCYTES NFR BLD AUTO: 9.5 % — SIGNIFICANT CHANGE UP (ref 2–14)
NEUTROPHILS # BLD AUTO: 2.12 K/UL — SIGNIFICANT CHANGE UP (ref 1.8–7.4)
NEUTROPHILS NFR BLD AUTO: 47.1 % — SIGNIFICANT CHANGE UP (ref 43–77)
NRBC # BLD AUTO: 0 K/UL — SIGNIFICANT CHANGE UP (ref 0–0)
NRBC # FLD: 0 K/UL — SIGNIFICANT CHANGE UP (ref 0–0)
NRBC BLD AUTO-RTO: 0 /100 WBCS — SIGNIFICANT CHANGE UP (ref 0–0)
PLATELET # BLD AUTO: 216 K/UL — SIGNIFICANT CHANGE UP (ref 150–400)
PMV BLD: 11.6 FL — SIGNIFICANT CHANGE UP (ref 7–13)
POTASSIUM SERPL-MCNC: 4 MMOL/L — SIGNIFICANT CHANGE UP (ref 3.5–5.3)
POTASSIUM SERPL-SCNC: 4 MMOL/L — SIGNIFICANT CHANGE UP (ref 3.5–5.3)
PROT SERPL-MCNC: 6 G/DL — LOW (ref 6.6–8.7)
RBC # BLD: 3.96 M/UL — SIGNIFICANT CHANGE UP (ref 3.8–5.2)
RBC # FLD: 12.1 % — SIGNIFICANT CHANGE UP (ref 10.3–14.5)
SODIUM SERPL-SCNC: 141 MMOL/L — SIGNIFICANT CHANGE UP (ref 135–145)
WBC # BLD: 4.51 K/UL — SIGNIFICANT CHANGE UP (ref 3.8–10.5)
WBC # FLD AUTO: 4.51 K/UL — SIGNIFICANT CHANGE UP (ref 3.8–10.5)

## 2025-06-13 PROCEDURE — 87086 URINE CULTURE/COLONY COUNT: CPT

## 2025-06-13 PROCEDURE — 36415 COLL VENOUS BLD VENIPUNCTURE: CPT

## 2025-06-13 PROCEDURE — 87040 BLOOD CULTURE FOR BACTERIA: CPT

## 2025-06-13 PROCEDURE — 83605 ASSAY OF LACTIC ACID: CPT

## 2025-06-13 PROCEDURE — 74177 CT ABD & PELVIS W/CONTRAST: CPT

## 2025-06-13 PROCEDURE — 84100 ASSAY OF PHOSPHORUS: CPT

## 2025-06-13 PROCEDURE — 83690 ASSAY OF LIPASE: CPT

## 2025-06-13 PROCEDURE — 83735 ASSAY OF MAGNESIUM: CPT

## 2025-06-13 PROCEDURE — 80053 COMPREHEN METABOLIC PANEL: CPT

## 2025-06-13 PROCEDURE — 85025 COMPLETE CBC W/AUTO DIFF WBC: CPT

## 2025-06-13 PROCEDURE — 93005 ELECTROCARDIOGRAM TRACING: CPT

## 2025-06-13 PROCEDURE — 99232 SBSQ HOSP IP/OBS MODERATE 35: CPT

## 2025-06-13 PROCEDURE — 80076 HEPATIC FUNCTION PANEL: CPT

## 2025-06-13 PROCEDURE — 84702 CHORIONIC GONADOTROPIN TEST: CPT

## 2025-06-13 PROCEDURE — 99285 EMERGENCY DEPT VISIT HI MDM: CPT

## 2025-06-13 PROCEDURE — 82728 ASSAY OF FERRITIN: CPT

## 2025-06-13 PROCEDURE — 96375 TX/PRO/DX INJ NEW DRUG ADDON: CPT

## 2025-06-13 PROCEDURE — 83540 ASSAY OF IRON: CPT

## 2025-06-13 PROCEDURE — 83550 IRON BINDING TEST: CPT

## 2025-06-13 PROCEDURE — 81001 URINALYSIS AUTO W/SCOPE: CPT

## 2025-06-13 PROCEDURE — 99239 HOSP IP/OBS DSCHRG MGMT >30: CPT

## 2025-06-13 PROCEDURE — 80048 BASIC METABOLIC PNL TOTAL CA: CPT

## 2025-06-13 PROCEDURE — 85045 AUTOMATED RETICULOCYTE COUNT: CPT

## 2025-06-13 PROCEDURE — 96374 THER/PROPH/DIAG INJ IV PUSH: CPT

## 2025-06-13 PROCEDURE — 84466 ASSAY OF TRANSFERRIN: CPT

## 2025-06-13 RX ORDER — ONDANSETRON HCL/PF 4 MG/2 ML
1 VIAL (ML) INJECTION
Qty: 9 | Refills: 0
Start: 2025-06-13 | End: 2025-06-15

## 2025-06-13 RX ORDER — SUMATRIPTAN 100 MG/1
1 TABLET, FILM COATED ORAL
Qty: 7 | Refills: 0
Start: 2025-06-13 | End: 2025-06-19

## 2025-06-13 RX ORDER — TRAMADOL HYDROCHLORIDE 50 MG/1
0.5 TABLET, FILM COATED ORAL
Qty: 14 | Refills: 0
Start: 2025-06-13 | End: 2025-06-19

## 2025-06-13 RX ORDER — FERROUS SULFATE 137(45) MG
1 TABLET, EXTENDED RELEASE ORAL
Qty: 30 | Refills: 0
Start: 2025-06-13 | End: 2025-07-12

## 2025-06-13 RX ORDER — CEFPODOXIME PROXETIL 200 MG/1
1 TABLET, FILM COATED ORAL
Qty: 20 | Refills: 0
Start: 2025-06-13 | End: 2025-06-22

## 2025-06-13 RX ADMIN — SUMATRIPTAN 25 MILLIGRAM(S): 100 TABLET, FILM COATED ORAL at 09:47

## 2025-06-13 RX ADMIN — Medication 1 GRAM(S): at 06:16

## 2025-06-13 RX ADMIN — CEFTRIAXONE 2000 MILLIGRAM(S): 500 INJECTION, POWDER, FOR SOLUTION INTRAMUSCULAR; INTRAVENOUS at 11:56

## 2025-06-13 RX ADMIN — SUMATRIPTAN 25 MILLIGRAM(S): 100 TABLET, FILM COATED ORAL at 08:41

## 2025-06-13 RX ADMIN — Medication 1 GRAM(S): at 11:56

## 2025-06-13 RX ADMIN — IRON SUCROSE 110 MILLIGRAM(S): 20 INJECTION, SOLUTION INTRAVENOUS at 10:05

## 2025-06-13 RX ADMIN — Medication 40 MILLIGRAM(S): at 08:40

## 2025-06-13 NOTE — DISCHARGE NOTE PROVIDER - HOSPITAL COURSE
49y/oF PMH recurrent UTIs with hx sepsis/bacteremia (starting in 8/2024), Asherman syndrome, GERD presenting to ER c/o subjective fever, chills, low back pain L>R, dysuria. Reports similar symptoms with UTI/pyelonephritis in the past, admitted with pyelonephritis. patient admitted to medicine and seen by id in consult    ct abd:    IMPRESSION: Mild fullness of the collecting systems.        Pyelonephritis   id consult following  - s/piv ceft  ct abd as above  - cultures negative so far  dc with  vantin

## 2025-06-13 NOTE — DISCHARGE NOTE NURSING/CASE MANAGEMENT/SOCIAL WORK - NSDCPEFALRISK_GEN_ALL_CORE
For information on Fall & Injury Prevention, visit: [email or web address] OR  [email or web address] OR  https://www.cdc.gov/steadi/patient.html

## 2025-06-13 NOTE — PROGRESS NOTE ADULT - SUBJECTIVE AND OBJECTIVE BOX
BRUNA BLOUNT    [id number]    49y      Female    INTERVAL HPI/OVERNIGHT EVENTS:  patient being seen for pyelo and states feeling better    REVIEW OF SYSTEMS:    CONSTITUTIONAL: No fever, weight loss, or fatigue  RESPIRATORY: No cough, wheezing, hemoptysis; No shortness of breath  CARDIOVASCULAR: No chest pain, palpitations  GASTROINTESTINAL: No abdominal or epigastric pain. No nausea, vomiting  NEUROLOGICAL: No headaches, memory loss, loss of strength.  MISCELLANEOUS:      Vital Signs Last 24 Hrs  T(C): 36.9 (11 Jun 2025 08:04), Max: 37 (11 Jun 2025 04:00)  T(F): 98.5 (11 Jun 2025 08:04), Max: 98.6 (11 Jun 2025 04:00)  HR: 82 (11 Jun 2025 08:04) (82 - 92)  BP: 107/65 (11 Jun 2025 08:04) (93/61 - 120/71)  BP(mean): 72 (10 Geovani 2025 14:56) (72 - 72)  RR: 16 (11 Jun 2025 08:04) (16 - 18)  SpO2: 96% (11 Jun 2025 08:04) (94% - 97%)    Parameters below as of 11 Jun 2025 08:04  Patient On (Oxygen Delivery Method): room air        PHYSICAL EXAM:    CONSTITUTIONAL: NAD  CARDIAC: Regular rate, regular rhythm.  normal +S1, S2  RESPIRATORY: Clear to auscultation bilaterally  GASTROINTESTINAL: Abdomen soft, non-tender, no guarding or rebound   : +CVA tenderness L>R  NEUROLOGICAL: Alert and oriented, grossly non-focal   SKIN: warm, dry    LABS:                        11.0   9.42  )-----------( 187      ( 11 Jun 2025 04:44 )             32.9     06-11    141  |  105  |  6.4[L]  ----------------------------<  102[H]  3.9   |  26.0  |  0.49[L]    Ca    8.7      11 Jun 2025 04:44  Phos  3.5     06-11  Mg     1.8     06-11    TPro  5.7[L]  /  Alb  3.3  /  TBili  0.3[L]  /  DBili  0.1  /  AST  19  /  ALT  17  /  AlkPhos  53  06-11      Urinalysis Basic - ( 11 Jun 2025 04:44 )    Color: x / Appearance: x / SG: x / pH: x  Gluc: 102 mg/dL / Ketone: x  / Bili: x / Urobili: x   Blood: x / Protein: x / Nitrite: x   Leuk Esterase: x / RBC: x / WBC x   Sq Epi: x / Non Sq Epi: x / Bacteria: x          MEDICATIONS  (STANDING):  cefTRIAXone Injectable. 2000 milliGRAM(s) IV Push every 24 hours  lactated ringers. 1000 milliLiter(s) (100 mL/Hr) IV Continuous <Continuous>  pantoprazole    Tablet 40 milliGRAM(s) Oral before breakfast  phenazopyridine 100 milliGRAM(s) Oral every 8 hours  sucralfate suspension 1 Gram(s) Oral four times a day    MEDICATIONS  (PRN):  acetaminophen     Tablet .. 650 milliGRAM(s) Oral every 6 hours PRN Temp greater or equal to 38C (100.4F), Mild Pain (1 - 3)  aluminum hydroxide/magnesium hydroxide/simethicone Suspension 30 milliLiter(s) Oral every 4 hours PRN Dyspepsia  melatonin 3 milliGRAM(s) Oral at bedtime PRN Insomnia  ondansetron Injectable 4 milliGRAM(s) IV Push every 8 hours PRN Nausea and/or Vomiting  SUMAtriptan 25 milliGRAM(s) Oral daily PRN Migraine  traMADol 25 milliGRAM(s) Oral every 6 hours PRN Moderate Pain (4 - 6)  traMADol 50 milliGRAM(s) Oral every 6 hours PRN Severe Pain (7 - 10)      RADIOLOGY & ADDITIONAL TESTS:  
BRUNA BLOUNT    [id number]    49y      Female    INTERVAL HPI/OVERNIGHT EVENTS: patient being seen for pyelo. patient states feeling better    patient complains of migraines    REVIEW OF SYSTEMS:    CONSTITUTIONAL: No fever, weight loss, or fatigue  RESPIRATORY: No cough, wheezing, hemoptysis; No shortness of breath  CARDIOVASCULAR: No chest pain, palpitations  GASTROINTESTINAL: No abdominal or epigastric pain. No nausea, vomiting  NEUROLOGICAL: migraines  MISCELLANEOUS:      Vital Signs Last 24 Hrs  T(C): 36.8 (12 Jun 2025 08:15), Max: 37.3 (12 Jun 2025 04:00)  T(F): 98.2 (12 Jun 2025 08:15), Max: 99.2 (12 Jun 2025 04:00)  HR: 79 (12 Jun 2025 08:15) (58 - 79)  BP: 117/72 (12 Jun 2025 08:15) (98/64 - 117/72)  BP(mean): --  RR: 18 (12 Jun 2025 08:15) (15 - 18)  SpO2: 94% (12 Jun 2025 08:15) (94% - 98%)    Parameters below as of 12 Jun 2025 08:15  Patient On (Oxygen Delivery Method): room air        PHYSICAL EXAM:    CONSTITUTIONAL: NAD  CARDIAC: Regular rate, regular rhythm.  normal +S1, S2  RESPIRATORY: Clear to auscultation bilaterally  GASTROINTESTINAL: Abdomen soft, non-tender, no guarding or rebound   : +CVA tenderness L>R  NEUROLOGICAL: Alert and oriented, grossly non-focal   SKIN: warm, dry      LABS:                        11.2   6.31  )-----------( 194      ( 12 Jun 2025 05:25 )             33.6     06-12    140  |  101  |  6.1[L]  ----------------------------<  98  3.7   |  26.0  |  0.57    Ca    8.5      12 Jun 2025 05:25  Phos  3.5     06-11  Mg     1.9     06-12    TPro  5.9[L]  /  Alb  3.4  /  TBili  <0.2[L]  /  DBili  x   /  AST  21  /  ALT  20  /  AlkPhos  59  06-12      Urinalysis Basic - ( 12 Jun 2025 05:25 )    Color: x / Appearance: x / SG: x / pH: x  Gluc: 98 mg/dL / Ketone: x  / Bili: x / Urobili: x   Blood: x / Protein: x / Nitrite: x   Leuk Esterase: x / RBC: x / WBC x   Sq Epi: x / Non Sq Epi: x / Bacteria: x          MEDICATIONS  (STANDING):  cefTRIAXone Injectable. 2000 milliGRAM(s) IV Push every 24 hours  iron sucrose IVPB 200 milliGRAM(s) IV Intermittent every 24 hours  lactated ringers. 1000 milliLiter(s) (100 mL/Hr) IV Continuous <Continuous>  pantoprazole    Tablet 40 milliGRAM(s) Oral before breakfast  polyethylene glycol 3350 17 Gram(s) Oral daily  sucralfate suspension 1 Gram(s) Oral four times a day    MEDICATIONS  (PRN):  acetaminophen     Tablet .. 650 milliGRAM(s) Oral every 6 hours PRN Temp greater or equal to 38C (100.4F), Mild Pain (1 - 3)  acetaminophen 325 mG/butalbital 50 mG/caffeine 40 mG 1 Tablet(s) Oral every 6 hours PRN Moderate Pain (4 - 6)  aluminum hydroxide/magnesium hydroxide/simethicone Suspension 30 milliLiter(s) Oral every 4 hours PRN Dyspepsia  lactulose Syrup 10 Gram(s) Oral every 8 hours PRN constipastion  melatonin 3 milliGRAM(s) Oral at bedtime PRN Insomnia  ondansetron Injectable 4 milliGRAM(s) IV Push every 8 hours PRN Nausea and/or Vomiting  SUMAtriptan 25 milliGRAM(s) Oral daily PRN Migraine  traMADol 25 milliGRAM(s) Oral every 6 hours PRN Moderate Pain (4 - 6)  traMADol 50 milliGRAM(s) Oral every 6 hours PRN Severe Pain (7 - 10)      RADIOLOGY & ADDITIONAL TESTS:  
                                           [facility]                                                INFECTIOUS DISEASES  =======================================================                   Remi Trivedi#   Kip Brown MD#    Malina Medley MD*                           Lora Barger MD*   Briseida Fitch MD*   Leida Rooney *           Diplomates American Board of Internal Medicine & Infectious Diseases                  # [place] Office - Appt - Tel  [phone] Fax [phone]                * [place] Office - Appt - Tel [phone] Fax [phone]                                  Hospital Consult line:  [phone]  =======================================================      [facility]-[id number]  BRUNA BLOUNT   follow up for: uti  no fever    flank pain better  no dysuria endorses burning sensation    patient seen and examined.       I have personally reviewed the labs and data; pertinent labs and data are listed in this note; please see below.   ===================================================  REVIEW OF SYSTEMS:  CONSTITUTIONAL:  No Fever or chills  HEENT:  No diplopia or blurred vision.  No earache, sore throat or runny nose.  CARDIOVASCULAR:  No pressure, squeezing, strangling, tightness, heaviness or aching about the chest, neck, axilla or epigastrium.  RESPIRATORY:  No cough, shortness of breath  GASTROINTESTINAL:  No nausea, vomiting or diarrhea.  GENITOURINARY:  No dysuria, frequency or urgency. No Blood in urine  MUSCULOSKELETAL:  no joint aches, no muscle pain  SKIN:  No change in skin, hair or nails.  NEUROLOGIC:  No Headaches, seizures or weakness.  PSYCHIATRIC:  No disorder of thought or mood.  ENDOCRINE:  No heat or cold intolerance  HEMATOLOGICAL:  No easy bruising or bleeding.    =======================================================  Allergies    No Known Allergies    Intolerances    Antibiotics:  cefTRIAXone Injectable. 2000 milliGRAM(s) IV Push every 24 hours    Other medications:  iron sucrose IVPB 200 milliGRAM(s) IV Intermittent every 24 hours  lactated ringers. 1000 milliLiter(s) IV Continuous <Continuous>  pantoprazole    Tablet 40 milliGRAM(s) Oral before breakfast  polyethylene glycol 3350 17 Gram(s) Oral daily  sucralfate suspension 1 Gram(s) Oral four times a day    ======================================================  Physical Exam:  ============  Vital Signs Last 24 Hrs  T(C): 36.8 (13 Jun 2025 09:00), Max: 36.9 (13 Jun 2025 04:00)  T(F): 98.3 (13 Jun 2025 09:00), Max: 98.4 (13 Jun 2025 04:00)  HR: 77 (13 Jun 2025 09:00) (61 - 77)  BP: 107/68 (13 Jun 2025 09:00) (101/62 - 109/76)  BP(mean): --  RR: 16 (13 Jun 2025 09:00) (16 - 18)  SpO2: 96% (13 Jun 2025 09:00) (94% - 97%)    Parameters below as of 13 Jun 2025 09:00  Patient On (Oxygen Delivery Method): room air        General:  No acute distress.  Eye: no conjunctival pallor, no scleral icterus  Respiratory: Lungs are clear to auscultation, Respirations are non-labored.  Cardiovascular: Normal rate, Regular rhythm,  s1+s2  Gastrointestinal: Soft, Non-tender, Non-distended, Normal bowel sounds.  Genitourinary: mild left costovertebral angle tenderness.    Integumentary: No rash.  Neurologic: Alert, Oriented, No focal deficits  Psychiatric: Appropriate mood & affect.  =======================================================  Labs:                                       11.5   4.51  )-----------( 216      ( 13 Jun 2025 06:10 )             34.7       06-13    141  |  103  |  9.7  ----------------------------<  86  4.0   |  28.0  |  0.55    Ca    8.4      13 Jun 2025 06:10  Mg     2.2     06-13    TPro  6.0[L]  /  Alb  3.3  /  TBili  <0.2[L]  /  DBili  x   /  AST  16  /  ALT  17  /  AlkPhos  55  06-13              Urinalysis Basic - ( 13 Jun 2025 06:10 )    Color: x / Appearance: x / SG: x / pH: x  Gluc: 86 mg/dL / Ketone: x  / Bili: x / Urobili: x   Blood: x / Protein: x / Nitrite: x   Leuk Esterase: x / RBC: x / WBC x   Sq Epi: x / Non Sq Epi: x / Bacteria: x                  CAPILLARY BLOOD GLUCOSE                    Culture - Blood (collected 06-10-25 @ 11:00)  Source: Blood Blood  Preliminary Report (06-11-25 @ 20:01):    No growth at 24 hours    Culture - Blood (collected 06-10-25 @ 10:50)  Source: Blood Blood  Preliminary Report (06-11-25 @ 20:01):    No growth at 24 hours    Culture - Urine (collected 06-10-25 @ 08:29)  Source: Clean Catch Clean Catch (Midstream)  Final Report (06-11-25 @ 15:59):    <10,000 CFU/mL Normal Urogenital Kinsey      < from: CT Abdomen and Pelvis w/ IV Cont (06.11.25 @ 14:30) >    LOWER CHEST: Within normal limits.    LIVER: Within normal limits.  BILE DUCTS: Normal caliber.  GALLBLADDER: Cholelithiasis.  SPLEEN: Within normal limits.  PANCREAS: Within normal limits.  ADRENALS: Within normal limits.  KIDNEYS/URETERS: Mild fullness of the collecting systems.    BLADDER: Within normal limits.  REPRODUCTIVE ORGANS: Within normal limits.    BOWEL: No bowel obstruction. The appendix is normal.  PERITONEUM/RETROPERITONEUM: Trace free fluid in the pelvis.  VESSELS:  Within normal limits.  LYMPH NODES: Within normal limits.  ABDOMINAL WALL: Within normal limits.  BONES: Within normal limits.    IMPRESSION: Mild fullness of the collecting systems.      < end of copied text >    
                                           [facility]                                                INFECTIOUS DISEASES  =======================================================                   Remi Trivedi#   Kip Brown MD#    Malina Medley MD*                           Lora Barger MD*   Briseida Fitch MD*   Leida Rooney *           Diplomates American Board of Internal Medicine & Infectious Diseases                  # [place] Office - Appt - Tel  [phone] Fax [phone]                * [place] Office - Appt - Tel [phone] Fax [phone]                                  Hospital Consult line:  [phone]  =======================================================      [id number]  BRUNA BLOUNT   follow up for: uti  no fever  +migraines  flank pain better  no dysuria endorses burning sensation  had difficulty urinating and urgency prior to arrival which hasimproved   patient seen and examined.       I have personally reviewed the labs and data; pertinent labs and data are listed in this note; please see below.   ===================================================  REVIEW OF SYSTEMS:  CONSTITUTIONAL:  No Fever or chills  HEENT:  No diplopia or blurred vision.  No earache, sore throat or runny nose.  CARDIOVASCULAR:  No pressure, squeezing, strangling, tightness, heaviness or aching about the chest, neck, axilla or epigastrium.  RESPIRATORY:  No cough, shortness of breath  GASTROINTESTINAL:  No nausea, vomiting or diarrhea.  GENITOURINARY:  No dysuria, frequency or urgency. No Blood in urine  MUSCULOSKELETAL:  no joint aches, no muscle pain  SKIN:  No change in skin, hair or nails.  NEUROLOGIC:  No Headaches, seizures or weakness.  PSYCHIATRIC:  No disorder of thought or mood.  ENDOCRINE:  No heat or cold intolerance  HEMATOLOGICAL:  No easy bruising or bleeding.    =======================================================  Allergies    No Known Allergies    Intolerances    Antibiotics:  cefTRIAXone Injectable. 2000 milliGRAM(s) IV Push every 24 hours    Other medications:  iron sucrose IVPB 200 milliGRAM(s) IV Intermittent every 24 hours  lactated ringers. 1000 milliLiter(s) IV Continuous <Continuous>  pantoprazole    Tablet 40 milliGRAM(s) Oral before breakfast  polyethylene glycol 3350 17 Gram(s) Oral daily  sucralfate suspension 1 Gram(s) Oral four times a day    ======================================================  Physical Exam:  ============  T(F): 98.2 (12 Jun 2025 08:15), Max: 99.2 (12 Jun 2025 04:00)  HR: 79 (12 Jun 2025 08:15)  BP: 117/72 (12 Jun 2025 08:15)  RR: 18 (12 Jun 2025 08:15)  SpO2: 94% (12 Jun 2025 08:15) (94% - 98%)  temp max in last 48H T(F): , Max: 99.2 (06-12-25 @ 04:00)    General:  No acute distress.  Eye: no conjunctival pallor, no scleral icterus  Respiratory: Lungs are clear to auscultation, Respirations are non-labored.  Cardiovascular: Normal rate, Regular rhythm,  s1+s2  Gastrointestinal: Soft, Non-tender, Non-distended, Normal bowel sounds.  Genitourinary: mild left costovertebral angle tenderness.    Integumentary: No rash.  Neurologic: Alert, Oriented, No focal deficits  Psychiatric: Appropriate mood & affect.  =======================================================  Labs:                        11.2   6.31  )-----------( 194      ( 12 Jun 2025 05:25 )             33.6     06-12    140  |  101  |  6.1[L]  ----------------------------<  98  3.7   |  26.0  |  0.57    Ca    8.5      12 Jun 2025 05:25  Phos  3.5     06-11  Mg     1.9     06-12    TPro  5.9[L]  /  Alb  3.4  /  TBili  <0.2[L]  /  DBili  x   /  AST  21  /  ALT  20  /  AlkPhos  59  06-12      Culture - Blood (collected 06-10-25 @ 11:00)  Source: Blood Blood  Preliminary Report (06-11-25 @ 20:01):    No growth at 24 hours    Culture - Blood (collected 06-10-25 @ 10:50)  Source: Blood Blood  Preliminary Report (06-11-25 @ 20:01):    No growth at 24 hours    Culture - Urine (collected 06-10-25 @ 08:29)  Source: Clean Catch Clean Catch (Midstream)  Final Report (06-11-25 @ 15:59):    <10,000 CFU/mL Normal Urogenital Kinsey      < from: CT Abdomen and Pelvis w/ IV Cont (06.11.25 @ 14:30) >    LOWER CHEST: Within normal limits.    LIVER: Within normal limits.  BILE DUCTS: Normal caliber.  GALLBLADDER: Cholelithiasis.  SPLEEN: Within normal limits.  PANCREAS: Within normal limits.  ADRENALS: Within normal limits.  KIDNEYS/URETERS: Mild fullness of the collecting systems.    BLADDER: Within normal limits.  REPRODUCTIVE ORGANS: Within normal limits.    BOWEL: No bowel obstruction. The appendix is normal.  PERITONEUM/RETROPERITONEUM: Trace free fluid in the pelvis.  VESSELS:  Within normal limits.  LYMPH NODES: Within normal limits.  ABDOMINAL WALL: Within normal limits.  BONES: Within normal limits.    IMPRESSION: Mild fullness of the collecting systems.      < end of copied text >

## 2025-06-13 NOTE — CHART NOTE - NSCHARTNOTEFT_GEN_A_CORE
To whom it may concern             Please excuse the absence of Ms Castaneda from her place of employment. She was under the care of [facility] from 6/10- 6/13 for a serious medical condition and will likley need further bed rest until Monday June 16th. Thank you for your consideration in this matter and please call Dr Sudeep Polk at [phone] with any questions          Dr Sudeep Polk M.D  #[id number] To whom it may concern             Please excuse the absence of Ms Castaneda from her place of employment. She was under the care of [facility] from 6/10- 6/13 for a serious medical condition and will likley need further bed rest until Thursday June 19th. Thank you for your consideration in this matter and please call Dr Sudeep Polk at [phone] with any questions          Dr Sudeep Polk M.D  #[id number]

## 2025-06-13 NOTE — PROGRESS NOTE ADULT - TIME BILLING
chart review, patient eval, review of outpatient records
chart review, patient eval, review of outpatient records

## 2025-06-13 NOTE — DISCHARGE NOTE NURSING/CASE MANAGEMENT/SOCIAL WORK - PATIENT PORTAL LINK FT
You can access the FollowMyHealth Patient Portal offered by [facility] by registering at the following website: [email or web address]. By joining [facility]’s FollowMyHealth portal, you will also be able to view your health information using other applications (apps) compatible with our system.

## 2025-06-13 NOTE — DISCHARGE NOTE PROVIDER - CARE PROVIDER_API CALL
Lora Barger  Infectious Disease  [address]  [place] [address]  Phone: [phone]  Fax: [phone]  Follow Up Time:     Fabiano Brandt  Urology  [address] [place] [address]  Phone: [phone]  Fax: [phone]  Follow Up Time:     primary care,   pcp  Phone: (   )    -  Fax: (   )    -  Follow Up Time:

## 2025-06-13 NOTE — DISCHARGE NOTE PROVIDER - PROVIDER TOKENS
PROVIDER:[TOKEN:[[id number]]],PROVIDER:[TOKEN:[[id number]]],FREE:[LAST:[primary care],PHONE:[(   )    -],FAX:[(   )    -],ADDRESS:[pcp]]

## 2025-06-13 NOTE — DISCHARGE NOTE PROVIDER - NSDCCPCAREPLAN_GEN_ALL_CORE_FT
PRINCIPAL DISCHARGE DIAGNOSIS  Diagnosis: Pyelonephritis  Assessment and Plan of Treatment:       SECONDARY DISCHARGE DIAGNOSES  Diagnosis: Iron deficiency anemia  Assessment and Plan of Treatment:

## 2025-06-13 NOTE — DISCHARGE NOTE PROVIDER - NSDCMRMEDTOKEN_GEN_ALL_CORE_FT
cefpodoxime 200 mg oral tablet: 1 tab(s) orally 2 times a day  ferrous sulfate 325 mg (65 mg elemental iron) oral tablet: 1 tab(s) orally once a day  omeprazole 20 mg oral delayed release tablet: 1 tab(s) orally once a day  ondansetron 4 mg oral tablet: 1 tab(s) orally every 8 hours as needed for  nausea

## 2025-06-13 NOTE — PROGRESS NOTE ADULT - ASSESSMENT
49y/oF PMH recurrent UTIs with hx sepsis/bacteremia (starting in 8/2024), Asherman syndrome, GERD presenting to ER c/o subjective fever, chills, low back pain L>R, dysuria. Reports similar symptoms with UTI/pyelonephritis in the past.   Reports seeing urology, gynecology, nephrology as outpt for further w/u given recurrent infections without significant findings thus far.    Patient admitted for pyelonephritis, reports this is her 5th infection in one year  ID called for pyelonephritis and recurrent UTI    - pt reports feeling better   - UA + pyuria  - f/u bcx ngtd  - f/u ucx ngtd ? if received abx prior to collection  - CT a/p as above mild fullness b/l of renal collecting system  - reviewed outpatient imaging on pt ipad- ct ap possible right extrarenal pelvis  - reviewed imaging from [facility], concern for caliectasis  - recommended urology input re above findings, may be causing recurrent UTI  - reviewed prior UCX and bcx from [place], only 1 + UCX for grp B strep ? colonization   - reviewed BCX and UCX from [facility]- both negative  - can change to cefpodoxime 200 mg bid tomorrow to complete total 2 weeks of abx. f/u outpatient to discuss chronic antibiotic suppression  - please refer to urology Dr Fabiano Brandt for outpatient eval  - outpatient obgyn f/u  - Trend Fever  - Trend Leukocytosis    signing off      
49y/oF PMH recurrent UTIs with hx sepsis/bacteremia (starting in 8/2024), Asherman syndrome, GERD presenting to ER c/o subjective fever, chills, low back pain L>R, dysuria. Reports similar symptoms with UTI/pyelonephritis in the past, admitted with pyelonephritis     Pyelonephritis   Leukocytosis w/bandemia   id consulted  - c.w iv ceft  - spoke to ID, order ct abd with iv contrast  c.w phenazopyridine     GERD   -cont ppi   sucralfate     vte ppx: scds     home in 1-2 days with likely po abx   
49y/oF PMH recurrent UTIs with hx sepsis/bacteremia (starting in 8/2024), Asherman syndrome, GERD presenting to ER c/o subjective fever, chills, low back pain L>R, dysuria. Reports similar symptoms with UTI/pyelonephritis in the past.   Reports seeing urology, gynecology, nephrology as outpt for further w/u given recurrent infections without significant findings thus far.    Patient admitted for pyelonephritis, reports this is her 5th infection in one year  ID called for pyelonephritis and recurrent UTI    - pt reports feeling better since yesterday, will c/w ceftriaxone  - UA + pyuria  - f/u bcx ngtd  - f/u ucx ngtd ? if received abx prior to collection  - CT a/p as above mild fullness b/l of renal collecting system  - reviewed outpatient imaging on pt ipad- ct ap possible right extrarenal pelvis  - reviewed imaging from [facility], concern for caliectasis  - recommended urology input re above findings, may be causing recurrent UTI  - reviewed prior UCX and bcx from [place], only 1 + UCX for grp B strep ? colonization   - if continues to improve, can change to po cefpodoxime 200mg bid tomorrow to complete total 2 weeks of abx  - please refer to urology Dr Fabiano Brandt for outpatient eval  - Trend Fever  - Trend Leukocytosis    d/w Dr Polk, RN      
49y/oF PMH recurrent UTIs with hx sepsis/bacteremia (starting in 8/2024), Asherman syndrome, GERD presenting to ER c/o subjective fever, chills, low back pain L>R, dysuria. Reports similar symptoms with UTI/pyelonephritis in the past, admitted with pyelonephritis     Pyelonephritis   id consult following  - c.w iv ceft  ct abd appreciated  - cultures negative so far    migraine - fioricet added prn      ren chirinos home tomorrow

## 2025-06-13 NOTE — DISCHARGE NOTE PROVIDER - CARE PROVIDERS DIRECT ADDRESSES
,DirectAddress_Unknown,[email or web address],DirectAddress_Unknown

## 2025-06-13 NOTE — DISCHARGE NOTE NURSING/CASE MANAGEMENT/SOCIAL WORK - FINANCIAL ASSISTANCE
[facility] provides services at a reduced cost to those who are determined to be eligible through [facility]’s financial assistance program. Information regarding [facility]’s financial assistance program can be found by going to [email or web address] or by calling [phone].

## 2025-06-15 LAB
CULTURE RESULTS: SIGNIFICANT CHANGE UP
CULTURE RESULTS: SIGNIFICANT CHANGE UP
SPECIMEN SOURCE: SIGNIFICANT CHANGE UP
SPECIMEN SOURCE: SIGNIFICANT CHANGE UP

## 2025-06-16 PROBLEM — K21.9 GASTRO-ESOPHAGEAL REFLUX DISEASE WITHOUT ESOPHAGITIS: Chronic | Status: ACTIVE | Noted: 2025-06-10

## 2025-06-16 PROBLEM — N85.6 INTRAUTERINE SYNECHIAE: Chronic | Status: ACTIVE | Noted: 2025-06-10

## 2025-06-25 ENCOUNTER — APPOINTMENT (OUTPATIENT)
Dept: INFECTIOUS DISEASE | Facility: CLINIC | Age: 50
End: 2025-06-25

## 2025-06-30 ENCOUNTER — NON-APPOINTMENT (OUTPATIENT)
Age: 50
End: 2025-06-30

## 2025-07-01 ENCOUNTER — APPOINTMENT (OUTPATIENT)
Dept: UROLOGY | Facility: CLINIC | Age: 50
End: 2025-07-01

## 2025-07-31 ENCOUNTER — NON-APPOINTMENT (OUTPATIENT)
Age: 50
End: 2025-07-31

## 2025-08-04 ENCOUNTER — EMERGENCY (EMERGENCY)
Facility: HOSPITAL | Age: 50
LOS: 1 days | End: 2025-08-04
Attending: EMERGENCY MEDICINE
Payer: COMMERCIAL

## 2025-08-04 VITALS
TEMPERATURE: 98 F | HEART RATE: 88 BPM | DIASTOLIC BLOOD PRESSURE: 83 MMHG | OXYGEN SATURATION: 100 % | SYSTOLIC BLOOD PRESSURE: 122 MMHG | RESPIRATION RATE: 20 BRPM | HEIGHT: 65 IN | WEIGHT: 134.48 LBS

## 2025-08-04 DIAGNOSIS — Z98.890 OTHER SPECIFIED POSTPROCEDURAL STATES: Chronic | ICD-10-CM

## 2025-08-04 LAB
ALBUMIN SERPL ELPH-MCNC: 3.9 G/DL — SIGNIFICANT CHANGE UP (ref 3.3–5.2)
ALP SERPL-CCNC: 91 U/L — SIGNIFICANT CHANGE UP (ref 40–120)
ALT FLD-CCNC: 18 U/L — SIGNIFICANT CHANGE UP
ANION GAP SERPL CALC-SCNC: 16 MMOL/L — SIGNIFICANT CHANGE UP (ref 5–17)
APPEARANCE UR: CLEAR — SIGNIFICANT CHANGE UP
AST SERPL-CCNC: 24 U/L — SIGNIFICANT CHANGE UP
BASOPHILS # BLD AUTO: 0.05 K/UL — SIGNIFICANT CHANGE UP (ref 0–0.2)
BASOPHILS NFR BLD AUTO: 0.8 % — SIGNIFICANT CHANGE UP (ref 0–2)
BILIRUB SERPL-MCNC: 0.2 MG/DL — LOW (ref 0.4–2)
BILIRUB UR-MCNC: NEGATIVE — SIGNIFICANT CHANGE UP
BUN SERPL-MCNC: 17.5 MG/DL — SIGNIFICANT CHANGE UP (ref 8–20)
CALCIUM SERPL-MCNC: 8.8 MG/DL — SIGNIFICANT CHANGE UP (ref 8.4–10.5)
CHLORIDE SERPL-SCNC: 103 MMOL/L — SIGNIFICANT CHANGE UP (ref 96–108)
CO2 SERPL-SCNC: 22 MMOL/L — SIGNIFICANT CHANGE UP (ref 22–29)
COLOR SPEC: YELLOW — SIGNIFICANT CHANGE UP
CREAT SERPL-MCNC: 0.67 MG/DL — SIGNIFICANT CHANGE UP (ref 0.5–1.3)
DIFF PNL FLD: NEGATIVE — SIGNIFICANT CHANGE UP
EGFR: 108 ML/MIN/1.73M2 — SIGNIFICANT CHANGE UP
EGFR: 108 ML/MIN/1.73M2 — SIGNIFICANT CHANGE UP
EOSINOPHIL # BLD AUTO: 0.1 K/UL — SIGNIFICANT CHANGE UP (ref 0–0.5)
EOSINOPHIL NFR BLD AUTO: 1.7 % — SIGNIFICANT CHANGE UP (ref 0–6)
GAS PNL BLDV: SIGNIFICANT CHANGE UP
GLUCOSE SERPL-MCNC: 85 MG/DL — SIGNIFICANT CHANGE UP (ref 70–99)
GLUCOSE UR QL: NEGATIVE MG/DL — SIGNIFICANT CHANGE UP
HCG SERPL-ACNC: <4 MIU/ML — SIGNIFICANT CHANGE UP
HCT VFR BLD CALC: 38 % — SIGNIFICANT CHANGE UP (ref 34.5–45)
HGB BLD-MCNC: 13.2 G/DL — SIGNIFICANT CHANGE UP (ref 11.5–15.5)
IMM GRANULOCYTES # BLD AUTO: 0.01 K/UL — SIGNIFICANT CHANGE UP (ref 0–0.07)
IMM GRANULOCYTES NFR BLD AUTO: 0.2 % — SIGNIFICANT CHANGE UP (ref 0–0.9)
KETONES UR QL: NEGATIVE MG/DL — SIGNIFICANT CHANGE UP
LEUKOCYTE ESTERASE UR-ACNC: NEGATIVE — SIGNIFICANT CHANGE UP
LYMPHOCYTES # BLD AUTO: 1.52 K/UL — SIGNIFICANT CHANGE UP (ref 1–3.3)
LYMPHOCYTES NFR BLD AUTO: 25.2 % — SIGNIFICANT CHANGE UP (ref 13–44)
MCHC RBC-ENTMCNC: 30.2 PG — SIGNIFICANT CHANGE UP (ref 27–34)
MCHC RBC-ENTMCNC: 34.7 G/DL — SIGNIFICANT CHANGE UP (ref 32–36)
MCV RBC AUTO: 87 FL — SIGNIFICANT CHANGE UP (ref 80–100)
MONOCYTES # BLD AUTO: 0.53 K/UL — SIGNIFICANT CHANGE UP (ref 0–0.9)
MONOCYTES NFR BLD AUTO: 8.8 % — SIGNIFICANT CHANGE UP (ref 2–14)
NEUTROPHILS # BLD AUTO: 3.81 K/UL — SIGNIFICANT CHANGE UP (ref 1.8–7.4)
NEUTROPHILS NFR BLD AUTO: 63.3 % — SIGNIFICANT CHANGE UP (ref 43–77)
NITRITE UR-MCNC: NEGATIVE — SIGNIFICANT CHANGE UP
NRBC # BLD AUTO: 0 K/UL — SIGNIFICANT CHANGE UP (ref 0–0)
NRBC # FLD: 0 K/UL — SIGNIFICANT CHANGE UP (ref 0–0)
NRBC BLD AUTO-RTO: 0 /100 WBCS — SIGNIFICANT CHANGE UP (ref 0–0)
PH UR: 7 — SIGNIFICANT CHANGE UP (ref 5–8)
PLATELET # BLD AUTO: 279 K/UL — SIGNIFICANT CHANGE UP (ref 150–400)
PMV BLD: 11.4 FL — SIGNIFICANT CHANGE UP (ref 7–13)
POTASSIUM SERPL-MCNC: 4.5 MMOL/L — SIGNIFICANT CHANGE UP (ref 3.5–5.3)
POTASSIUM SERPL-SCNC: 4.5 MMOL/L — SIGNIFICANT CHANGE UP (ref 3.5–5.3)
PROT SERPL-MCNC: 6.5 G/DL — LOW (ref 6.6–8.7)
PROT UR-MCNC: NEGATIVE MG/DL — SIGNIFICANT CHANGE UP
RBC # BLD: 4.37 M/UL — SIGNIFICANT CHANGE UP (ref 3.8–5.2)
RBC # FLD: 12.7 % — SIGNIFICANT CHANGE UP (ref 10.3–14.5)
SODIUM SERPL-SCNC: 140 MMOL/L — SIGNIFICANT CHANGE UP (ref 135–145)
SP GR SPEC: 1.01 — SIGNIFICANT CHANGE UP (ref 1–1.03)
UROBILINOGEN FLD QL: 0.2 MG/DL — SIGNIFICANT CHANGE UP (ref 0.2–1)
WBC # BLD: 6.02 K/UL — SIGNIFICANT CHANGE UP (ref 3.8–10.5)
WBC # FLD AUTO: 6.02 K/UL — SIGNIFICANT CHANGE UP (ref 3.8–10.5)

## 2025-08-04 PROCEDURE — 84702 CHORIONIC GONADOTROPIN TEST: CPT

## 2025-08-04 PROCEDURE — 99215 OFFICE O/P EST HI 40 MIN: CPT

## 2025-08-04 PROCEDURE — 82435 ASSAY OF BLOOD CHLORIDE: CPT

## 2025-08-04 PROCEDURE — 81003 URINALYSIS AUTO W/O SCOPE: CPT

## 2025-08-04 PROCEDURE — 85018 HEMOGLOBIN: CPT

## 2025-08-04 PROCEDURE — 82330 ASSAY OF CALCIUM: CPT

## 2025-08-04 PROCEDURE — 74177 CT ABD & PELVIS W/CONTRAST: CPT

## 2025-08-04 PROCEDURE — 87086 URINE CULTURE/COLONY COUNT: CPT

## 2025-08-04 PROCEDURE — 82947 ASSAY GLUCOSE BLOOD QUANT: CPT

## 2025-08-04 PROCEDURE — 36415 COLL VENOUS BLD VENIPUNCTURE: CPT

## 2025-08-04 PROCEDURE — 84132 ASSAY OF SERUM POTASSIUM: CPT

## 2025-08-04 PROCEDURE — 85025 COMPLETE CBC W/AUTO DIFF WBC: CPT

## 2025-08-04 PROCEDURE — 80053 COMPREHEN METABOLIC PANEL: CPT

## 2025-08-04 PROCEDURE — 74177 CT ABD & PELVIS W/CONTRAST: CPT | Mod: 26

## 2025-08-04 PROCEDURE — 85014 HEMATOCRIT: CPT

## 2025-08-04 PROCEDURE — 83605 ASSAY OF LACTIC ACID: CPT

## 2025-08-04 PROCEDURE — 84295 ASSAY OF SERUM SODIUM: CPT

## 2025-08-04 PROCEDURE — 99223 1ST HOSP IP/OBS HIGH 75: CPT

## 2025-08-04 PROCEDURE — 82803 BLOOD GASES ANY COMBINATION: CPT

## 2025-08-04 RX ORDER — CEFTRIAXONE 500 MG/1
1000 INJECTION, POWDER, FOR SOLUTION INTRAMUSCULAR; INTRAVENOUS ONCE
Refills: 0 | Status: COMPLETED | OUTPATIENT
Start: 2025-08-04 | End: 2025-08-05

## 2025-08-04 RX ORDER — CEFTRIAXONE 500 MG/1
2000 INJECTION, POWDER, FOR SOLUTION INTRAMUSCULAR; INTRAVENOUS ONCE
Refills: 0 | Status: COMPLETED | OUTPATIENT
Start: 2025-08-04 | End: 2025-08-04

## 2025-08-04 RX ORDER — ACETAMINOPHEN 500 MG/5ML
1000 LIQUID (ML) ORAL ONCE
Refills: 0 | Status: COMPLETED | OUTPATIENT
Start: 2025-08-04 | End: 2025-08-04

## 2025-08-04 RX ORDER — ONDANSETRON HCL/PF 4 MG/2 ML
4 VIAL (ML) INJECTION ONCE
Refills: 0 | Status: COMPLETED | OUTPATIENT
Start: 2025-08-04 | End: 2025-08-04

## 2025-08-04 RX ORDER — MELATONIN 5 MG
5 TABLET ORAL ONCE
Refills: 0 | Status: COMPLETED | OUTPATIENT
Start: 2025-08-04 | End: 2025-08-04

## 2025-08-04 RX ORDER — MAGNESIUM, ALUMINUM HYDROXIDE 200-200 MG
30 TABLET,CHEWABLE ORAL ONCE
Refills: 0 | Status: COMPLETED | OUTPATIENT
Start: 2025-08-04 | End: 2025-08-04

## 2025-08-04 RX ORDER — CEFTRIAXONE 500 MG/1
2000 INJECTION, POWDER, FOR SOLUTION INTRAMUSCULAR; INTRAVENOUS ONCE
Refills: 0 | Status: DISCONTINUED | OUTPATIENT
Start: 2025-08-04 | End: 2025-08-04

## 2025-08-04 RX ADMIN — Medication 4 MILLIGRAM(S): at 10:30

## 2025-08-04 RX ADMIN — CEFTRIAXONE 2000 MILLIGRAM(S): 500 INJECTION, POWDER, FOR SOLUTION INTRAMUSCULAR; INTRAVENOUS at 10:00

## 2025-08-04 RX ADMIN — Medication 30 MILLILITER(S): at 21:05

## 2025-08-04 RX ADMIN — Medication 5 MILLIGRAM(S): at 23:54

## 2025-08-04 RX ADMIN — Medication 1000 MILLILITER(S): at 10:00

## 2025-08-04 RX ADMIN — Medication 400 MILLIGRAM(S): at 10:00

## 2025-08-05 VITALS
RESPIRATION RATE: 18 BRPM | HEART RATE: 59 BPM | OXYGEN SATURATION: 99 % | DIASTOLIC BLOOD PRESSURE: 77 MMHG | SYSTOLIC BLOOD PRESSURE: 119 MMHG | TEMPERATURE: 98 F

## 2025-08-05 LAB
CANDIDA AB TITR SER: SIGNIFICANT CHANGE UP
CULTURE RESULTS: SIGNIFICANT CHANGE UP
G VAGINALIS DNA SPEC QL NAA+PROBE: SIGNIFICANT CHANGE UP
SPECIMEN SOURCE: SIGNIFICANT CHANGE UP
T VAGINALIS SPEC QL WET PREP: SIGNIFICANT CHANGE UP

## 2025-08-05 PROCEDURE — G0378: CPT

## 2025-08-05 PROCEDURE — 74177 CT ABD & PELVIS W/CONTRAST: CPT

## 2025-08-05 PROCEDURE — 99238 HOSP IP/OBS DSCHRG MGMT 30/<: CPT

## 2025-08-05 PROCEDURE — 84295 ASSAY OF SERUM SODIUM: CPT

## 2025-08-05 PROCEDURE — 99284 EMERGENCY DEPT VISIT MOD MDM: CPT | Mod: 25

## 2025-08-05 PROCEDURE — 96376 TX/PRO/DX INJ SAME DRUG ADON: CPT

## 2025-08-05 PROCEDURE — 87086 URINE CULTURE/COLONY COUNT: CPT

## 2025-08-05 PROCEDURE — 81003 URINALYSIS AUTO W/O SCOPE: CPT

## 2025-08-05 PROCEDURE — 84132 ASSAY OF SERUM POTASSIUM: CPT

## 2025-08-05 PROCEDURE — 84702 CHORIONIC GONADOTROPIN TEST: CPT

## 2025-08-05 PROCEDURE — 85025 COMPLETE CBC W/AUTO DIFF WBC: CPT

## 2025-08-05 PROCEDURE — 87040 BLOOD CULTURE FOR BACTERIA: CPT

## 2025-08-05 PROCEDURE — 85018 HEMOGLOBIN: CPT

## 2025-08-05 PROCEDURE — 96374 THER/PROPH/DIAG INJ IV PUSH: CPT | Mod: XU

## 2025-08-05 PROCEDURE — 82435 ASSAY OF BLOOD CHLORIDE: CPT

## 2025-08-05 PROCEDURE — 87800 DETECT AGNT MULT DNA DIREC: CPT

## 2025-08-05 PROCEDURE — 83605 ASSAY OF LACTIC ACID: CPT

## 2025-08-05 PROCEDURE — 82330 ASSAY OF CALCIUM: CPT

## 2025-08-05 PROCEDURE — 85014 HEMATOCRIT: CPT

## 2025-08-05 PROCEDURE — 82803 BLOOD GASES ANY COMBINATION: CPT

## 2025-08-05 PROCEDURE — 36415 COLL VENOUS BLD VENIPUNCTURE: CPT

## 2025-08-05 PROCEDURE — 82947 ASSAY GLUCOSE BLOOD QUANT: CPT

## 2025-08-05 PROCEDURE — 96375 TX/PRO/DX INJ NEW DRUG ADDON: CPT

## 2025-08-05 PROCEDURE — 99214 OFFICE O/P EST MOD 30 MIN: CPT

## 2025-08-05 PROCEDURE — 80053 COMPREHEN METABOLIC PANEL: CPT

## 2025-08-05 RX ORDER — IBUPROFEN 200 MG
600 TABLET ORAL EVERY 6 HOURS
Refills: 0 | Status: DISCONTINUED | OUTPATIENT
Start: 2025-08-05 | End: 2025-08-11

## 2025-08-05 RX ORDER — METRONIDAZOLE 250 MG
1 TABLET ORAL
Qty: 14 | Refills: 0
Start: 2025-08-05 | End: 2025-08-11

## 2025-08-05 RX ORDER — CEFPODOXIME PROXETIL 200 MG/1
1 TABLET, FILM COATED ORAL
Qty: 14 | Refills: 0
Start: 2025-08-05 | End: 2025-08-11

## 2025-08-05 RX ADMIN — Medication 600 MILLIGRAM(S): at 10:46

## 2025-08-05 RX ADMIN — Medication 600 MILLIGRAM(S): at 12:16

## 2025-08-05 RX ADMIN — Medication 600 MILLIGRAM(S): at 09:51

## 2025-08-05 RX ADMIN — CEFTRIAXONE 1000 MILLIGRAM(S): 500 INJECTION, POWDER, FOR SOLUTION INTRAMUSCULAR; INTRAVENOUS at 10:09

## 2025-08-05 RX ADMIN — Medication 600 MILLIGRAM(S): at 14:46

## 2025-08-05 RX ADMIN — Medication 40 MILLIGRAM(S): at 06:23
